# Patient Record
Sex: FEMALE | Race: WHITE | Employment: OTHER | URBAN - METROPOLITAN AREA
[De-identification: names, ages, dates, MRNs, and addresses within clinical notes are randomized per-mention and may not be internally consistent; named-entity substitution may affect disease eponyms.]

---

## 2017-07-18 RX ORDER — LOSARTAN POTASSIUM 100 MG/1
100 TABLET ORAL DAILY
COMMUNITY

## 2017-07-18 RX ORDER — ROSUVASTATIN CALCIUM 10 MG/1
10 TABLET, COATED ORAL
COMMUNITY

## 2017-07-18 NOTE — PERIOP NOTES
[address]  Endoscopy Preprocedure Instructions      1. On the day of your surgery, please report to registration located on the 2nd floor of the  [facility]. yes    2. You must have a responsible adult to drive you to the hospital, stay at the hospital during your procedure and drive you home. If they leave your procedure will not be started (no exceptions). yes    3. Do not have anything to eat or drink (including water, gum, mints, coffee, and juice) after midnight. This does not apply to the medications you were instructed to take by your physician. yesIf you are currently taking Plavix, Coumadin, Aspirin, or other blood-thinning agents, contact your physician for special instructions. not applicable,    4. If you are having a procedure that requires bowel prep: We recommend that you have only clear liquids the day before your procedure and begin your bowel prep by 5:00 pm.  You may continue to drink clear liquids until midnight. If for any reason you are not able to complete your prep please notify your physician immediately. yes    5. Have a list of all current medications, including vitamins, herbal supplements and any other over the counter medications. yes    6. If you wear glasses, contacts, dentures and/or hearing aids, they may be removed prior to procedure, please bring a case to store them in. yes    7. You should understand that if you do not follow these instructions your procedure may be cancelled. If your physical condition changes (I.e. fever, cold or flu) please contact your doctor as soon as possible. 8. It is important that you be on time.   If for any reason you are unable to keep your appointment please call )- the day of or your physicians office prior to your scheduled procedure

## 2017-07-19 ENCOUNTER — HOSPITAL ENCOUNTER (OUTPATIENT)
Age: 77
Setting detail: OUTPATIENT SURGERY
Discharge: HOME | End: 2017-07-19
Attending: COLON & RECTAL SURGERY | Admitting: COLON & RECTAL SURGERY
Payer: MEDICARE

## 2017-07-19 ENCOUNTER — ANESTHESIA EVENT (OUTPATIENT)
Dept: ENDOSCOPY | Age: 77
End: 2017-07-19
Payer: MEDICARE

## 2017-07-19 ENCOUNTER — ANESTHESIA (OUTPATIENT)
Dept: ENDOSCOPY | Age: 77
End: 2017-07-19
Payer: MEDICARE

## 2017-07-19 VITALS
WEIGHT: 150 LBS | HEIGHT: 61 IN | TEMPERATURE: 97.6 F | RESPIRATION RATE: 17 BRPM | BODY MASS INDEX: 28.32 KG/M2 | DIASTOLIC BLOOD PRESSURE: 80 MMHG | SYSTOLIC BLOOD PRESSURE: 134 MMHG | OXYGEN SATURATION: 97 % | HEART RATE: 69 BPM

## 2017-07-19 PROCEDURE — 74011000250 HC RX REV CODE- 250

## 2017-07-19 PROCEDURE — 76040000019: Performed by: COLON & RECTAL SURGERY

## 2017-07-19 PROCEDURE — 74011250636 HC RX REV CODE- 250/636

## 2017-07-19 PROCEDURE — 76060000031 HC ANESTHESIA FIRST 0.5 HR: Performed by: COLON & RECTAL SURGERY

## 2017-07-19 RX ORDER — FLUMAZENIL 0.1 MG/ML
0.2 INJECTION INTRAVENOUS
Status: DISCONTINUED | OUTPATIENT
Start: 2017-07-19 | End: 2017-07-19 | Stop reason: HOSPADM

## 2017-07-19 RX ORDER — DEXTROMETHORPHAN/PSEUDOEPHED 2.5-7.5/.8
1.2 DROPS ORAL
Status: DISCONTINUED | OUTPATIENT
Start: 2017-07-19 | End: 2017-07-19 | Stop reason: HOSPADM

## 2017-07-19 RX ORDER — ATROPINE SULFATE 0.1 MG/ML
0.5 INJECTION INTRAVENOUS
Status: DISCONTINUED | OUTPATIENT
Start: 2017-07-19 | End: 2017-07-19 | Stop reason: HOSPADM

## 2017-07-19 RX ORDER — NALOXONE HYDROCHLORIDE 0.4 MG/ML
0.4 INJECTION, SOLUTION INTRAMUSCULAR; INTRAVENOUS; SUBCUTANEOUS
Status: DISCONTINUED | OUTPATIENT
Start: 2017-07-19 | End: 2017-07-19 | Stop reason: HOSPADM

## 2017-07-19 RX ORDER — LIDOCAINE HYDROCHLORIDE 20 MG/ML
INJECTION, SOLUTION EPIDURAL; INFILTRATION; INTRACAUDAL; PERINEURAL AS NEEDED
Status: DISCONTINUED | OUTPATIENT
Start: 2017-07-19 | End: 2017-07-19 | Stop reason: HOSPADM

## 2017-07-19 RX ORDER — PROPOFOL 10 MG/ML
INJECTION, EMULSION INTRAVENOUS AS NEEDED
Status: DISCONTINUED | OUTPATIENT
Start: 2017-07-19 | End: 2017-07-19 | Stop reason: HOSPADM

## 2017-07-19 RX ORDER — SODIUM CHLORIDE 0.9 % (FLUSH) 0.9 %
5-10 SYRINGE (ML) INJECTION EVERY 8 HOURS
Status: DISCONTINUED | OUTPATIENT
Start: 2017-07-19 | End: 2017-07-19 | Stop reason: HOSPADM

## 2017-07-19 RX ORDER — SODIUM CHLORIDE 9 MG/ML
INJECTION, SOLUTION INTRAVENOUS
Status: DISCONTINUED | OUTPATIENT
Start: 2017-07-19 | End: 2017-07-19 | Stop reason: HOSPADM

## 2017-07-19 RX ORDER — PROPOFOL 10 MG/ML
INJECTION, EMULSION INTRAVENOUS
Status: DISCONTINUED | OUTPATIENT
Start: 2017-07-19 | End: 2017-07-19 | Stop reason: HOSPADM

## 2017-07-19 RX ORDER — SODIUM CHLORIDE 0.9 % (FLUSH) 0.9 %
5-10 SYRINGE (ML) INJECTION AS NEEDED
Status: DISCONTINUED | OUTPATIENT
Start: 2017-07-19 | End: 2017-07-19 | Stop reason: HOSPADM

## 2017-07-19 RX ADMIN — PROPOFOL 120 MCG/KG/MIN: 10 INJECTION, EMULSION INTRAVENOUS at 16:17

## 2017-07-19 RX ADMIN — LIDOCAINE HYDROCHLORIDE 40 MG: 20 INJECTION, SOLUTION EPIDURAL; INFILTRATION; INTRACAUDAL; PERINEURAL at 16:17

## 2017-07-19 RX ADMIN — PROPOFOL 70 MG: 10 INJECTION, EMULSION INTRAVENOUS at 16:17

## 2017-07-19 RX ADMIN — SODIUM CHLORIDE: 9 INJECTION, SOLUTION INTRAVENOUS at 16:07

## 2017-07-19 NOTE — ANESTHESIA POSTPROCEDURE EVALUATION
Post-Anesthesia Evaluation and Assessment    Patient: Kel Mcwilliams MRN: [id number]  SSN: [id number]    YOB: 1940  Age: 68 y.o. Sex: female       Cardiovascular Function/Vital Signs  Visit Vitals    /70    Pulse 76    Temp 36.4 °C (97.6 °F)    Resp 27    Ht 5' 1\" (1.549 m)    Wt 68 kg (150 lb)    SpO2 96%    BMI 28.34 kg/m2       Patient is status post MAC anesthesia for Procedure(s):  COLONOSCOPY. Nausea/Vomiting: None    Postoperative hydration reviewed and adequate. Pain:  Pain Scale 1: Numeric (0 - 10) (07/19/17 1637)  Pain Intensity 1: 0 (07/19/17 1637)   Managed    Neurological Status: At baseline    Mental Status and Level of Consciousness: Arousable    Pulmonary Status:   O2 Device: Room air (07/19/17 1637)   Adequate oxygenation and airway patent    Complications related to anesthesia: None    Post-anesthesia assessment completed.  No concerns    Signed By: Nancy Putnam MD     July 19, 2017

## 2017-07-19 NOTE — PROGRESS NOTES
Received report from CRNA, patient comfortable after procedure, no complaints of pain. See anesthesia record for medications. Endoscope was pre-cleaned at bedside immediately following procedure by Mark.

## 2017-07-19 NOTE — ANESTHESIA PREPROCEDURE EVALUATION
Anesthetic History   No history of anesthetic complications            Review of Systems / Medical History  Patient summary reviewed and pertinent labs reviewed    Pulmonary  Within defined limits                 Neuro/Psych   Within defined limits           Cardiovascular    Hypertension              Exercise tolerance: >4 METS     GI/Hepatic/Renal  Within defined limits              Endo/Other  Within defined limits           Other Findings              Physical Exam    Airway  Mallampati: II  TM Distance: 4 - 6 cm  Neck ROM: normal range of motion   Mouth opening: Normal     Cardiovascular  Regular rate and rhythm,  S1 and S2 normal,  no murmur, click, rub, or gallop  Rhythm: regular  Rate: normal         Dental  No notable dental hx       Pulmonary  Breath sounds clear to auscultation               Abdominal  GI exam deferred       Other Findings            Anesthetic Plan    ASA: 2  Anesthesia type: MAC          Induction: Intravenous  Anesthetic plan and risks discussed with: Patient

## 2017-07-19 NOTE — H&P
Gastroenterology Outpatient History and Physical    Patient: [facility]    Physician: Daksha Haley MD    Vital Signs: Blood pressure 128/55, pulse 79, temperature 98.7 °F (37.1 °C), resp. rate 16, height 5' 1\" (1.549 m), weight 68 kg (150 lb), SpO2 100 %. Allergies: Allergies   Allergen Reactions    Contrast Dye [Iodine] Rash    Morphine Shortness of Breath       Chief Complaint: history of colon cancer    History of Present Illness: as above    Justification for Procedure: as above    History:  Past Medical History:   Diagnosis Date    Colon cancer ([facility] [address] 2007    Essential hypertension     Family history of skin cancer     Heart abnormalities       Past Surgical History:   Procedure Laterality Date    HX CHOLECYSTECTOMY      HX GYN        Social History     Social History    Marital status:      Spouse name: N/A    Number of children: N/A    Years of education: N/A     Social History Main Topics    Smoking status: Never Smoker    Smokeless tobacco: None    Alcohol use No    Drug use: None    Sexual activity: Not Asked     Other Topics Concern    None     Social History Narrative    History reviewed. No pertinent family history. Medications:   Prior to Admission medications    Medication Sig Start Date End Date Taking? Authorizing Provider   losartan (COZAAR) 100 mg tablet Take 100 mg by mouth daily. Yes Historical Provider   rosuvastatin (CRESTOR) 10 mg tablet Take 10 mg by mouth nightly. Yes Historical Provider   aspirin 81 mg tablet Take 81 mg by mouth. Yes Historical Provider   Desvenlafaxine SR (PRISTIQ) 50 mg tablet Take 50 mg by mouth. Yes Historical Provider       Physical Exam:   General: alert, no distress   HEENT: Head: Normocephalic, no lesions, without obvious abnormality.    Heart: regular rate and rhythm, S1, S2 normal, no murmur, click, rub or gallop   Lungs: chest clear, no wheezing, rales, normal symmetric air entry   Abdominal: Bowel sounds are normal, liver is not enlarged, spleen is not enlarged   Neurological: Grossly normal   Extremities: extremities normal, atraumatic, no cyanosis or edema     Findings/Diagnosis: as above    Plan of Care/Planned Procedure: colonoscopy    Signed By: Juanjo Marte MD     July 19, 2017

## 2017-07-19 NOTE — ROUTINE PROCESS
Lucy Curtis  1940  [id number]    Situation:  Verbal report received from: Allie Ramirez RN  Procedure: Procedure(s):  COLONOSCOPY    Background:    Preoperative diagnosis: HX COLON CA  Postoperative diagnosis: NORMAL residual colon    :  Dr. Erlin Purvis  Assistant(s): Endoscopy Technician-1: Mecca Sosa  Endoscopy RN-1: Shellee Harada, RN    Specimens: * No specimens in log *  H. Pylori  no    Assessment:  Intra-procedure medications   Anesthesia gave intra-procedure sedation and medications, see anesthesia flow sheet yes    Intravenous fluids: NS@ KVO     Vital signs stable     Abdominal assessment: round and soft     Recommendation:  Discharge patient per MD order. Family or Friend   Permission to share finding with family or friend yes    Endoscopy discharge instructions have been reviewed and given to patient and daughter. The patient and daughter verbalized understanding and acceptance of instructions.

## 2017-07-19 NOTE — PROCEDURES
[facility]   Endo Brief Procedure Note    Sunil Lock  1940  [id number]    Date of Procedure: 7/19/2017    Preoperative diagnosis: HX COLON CA    Postoperative diagnosis: NORMAL residual colon    Procedure: Procedure(s):  COLONOSCOPY    :  Dr. Adella Siemens, MD    Assistant(s): Endoscopy Technician-1: Jose Sosa  Endoscopy RN-1: Ivet Gama RN    EBL:None    Anesthesia/Sedation: Moderate sedation / MAC    Specimens: * No specimens in log *    Findings: diverticulosis    Complications: None    Dr. Adella Siemens, MD  7/19/2017  4:28 PM

## 2017-07-19 NOTE — DISCHARGE INSTRUCTIONS
Luis Hernandez  [id number]  1940    COLON DISCHARGE INSTRUCTIONS  Discomfort:  Redness at IV site- apply warm compress to area; if redness or soreness persist- contact your physician  There may be a slight amount of blood passed from the rectum  Gaseous discomfort- walking, belching will help relieve any discomfort  You may not operate a vehicle for 12 hours  You may not engage in an occupation involving machinery or appliances for rest of today  You may not drink alcoholic beverages for at least 12 hours  Avoid making any critical decisions for at least 24 hour  DIET:   High fiber diet. - however -  remember your colon is empty and a heavy meal will produce gas. Avoid these foods:  vegetables, fried / greasy foods, carbonated drinks for today    MEDICATIONS:           ACTIVITY:  You may resume your normal daily activities it is recommended that you spend the remainder of the day resting -  avoid any strenuous activity. CALL M.D. ANY SIGN OF:   Increasing pain, nausea, vomiting  Abdominal distension (swelling)  New increased bleeding (oral or rectal)  Fever (chills)  Pain in chest area  Bloody discharge from nose or mouth  Shortness of breath     Follow-up Instructions:   Call Maximiliano Lugo MD if any questions or problems. Telephone # [phone]  Should have a repeat colonoscopy in 3 years. COLONOSCOPY FINDINGS:  Your colonoscopy showed: diverticulosis.

## 2017-07-19 NOTE — IP AVS SNAPSHOT
[address] 
[phone] Patient: Yamileth Al MRN: [id number] You are allergic to the following Allergen Reactions Contrast Dye (Iodine) Rash Morphine Shortness of Breath Recent Documentation Height Weight BMI OB Status Smoking Status 1.549 m 68 kg 28.34 kg/m2 Postmenopausal Never Smoker Emergency Contacts Name Discharge Info Relation Home Work Mobile Case,Kim  Child [2]   [phone] About your hospitalization You were admitted on:  2017 You last received care in the:  [facility] ENDOSCOPY You were discharged on:  2017 Unit phone number:  [phone] Why you were hospitalized Your primary diagnosis was:  Not on File Providers Seen During Your Hospitalizations Provider Role Specialty Primary office phone Lovely Vance MD Attending Provider Colon and Rectal Surgery [phone] Your Primary Care Physician (PCP) Primary Care Physician Office Phone Office Fax [facility] [phone] Follow-up Information Follow up With Details Comments Contact Info Michelle Juan MD   [address] [place] [address] 
[phone] Current Discharge Medication List  
  
CONTINUE these medications which have NOT CHANGED Dose & Instructions Dispensing Information Comments Morning Noon Evening Bedtime  
 aspirin 81 mg tablet Your last dose was: Your next dose is:    
   
   
 Dose:  81 mg Take 81 mg by mouth. Refills:  0  
     
   
   
   
  
 CRESTOR 10 mg tablet Generic drug:  rosuvastatin Your last dose was: Your next dose is:    
   
   
 Dose:  10 mg Take 10 mg by mouth nightly. Refills:  0  
     
   
   
   
  
 losartan 100 mg tablet Commonly known as:  COZAAR Your last dose was: Your next dose is:    
   
   
 Dose:  100 mg Take 100 mg by mouth daily. Refills:  0 PRISTIQ 50 mg ER tablet Generic drug:  desvenlafaxine succinate Your last dose was: Your next dose is:    
   
   
 Dose:  50 mg Take 50 mg by mouth. Refills:  0 Discharge Instructions Chelsey Garcia 
[id number] 
1940 COLON DISCHARGE INSTRUCTIONS Discomfort: 
Redness at IV site- apply warm compress to area; if redness or soreness persist- contact your physician There may be a slight amount of blood passed from the rectum Gaseous discomfort- walking, belching will help relieve any discomfort You may not operate a vehicle for 12 hours You may not engage in an occupation involving machinery or appliances for rest of today You may not drink alcoholic beverages for at least 12 hours Avoid making any critical decisions for at least 24 hour DIET: 
 High fiber diet.  however -  remember your colon is empty and a heavy meal will produce gas. Avoid these foods:  vegetables, fried / greasy foods, carbonated drinks for today MEDICATIONS: 
 
 
 
  
ACTIVITY: 
You may resume your normal daily activities it is recommended that you spend the remainder of the day resting -  avoid any strenuous activity. CALL M.D. ANY SIGN OF: Increasing pain, nausea, vomiting Abdominal distension (swelling) New increased bleeding (oral or rectal) Fever (chills) Pain in chest area Bloody discharge from nose or mouth Shortness of breath Follow-up Instructions: 
 Call Agata Ferrari MD if any questions or problems. Telephone # [phone] Should have a repeat colonoscopy in 3 years. COLONOSCOPY FINDINGS: 
Your colonoscopy showed: diverticulosis. Discharge Orders None Introducing [facility]!    
 [facility] introduces [employer] patient portal. Now you can access parts of your medical record, email your doctor's office, and request medication refills online. 1. In your internet browser, go to [email or web address] 2. Click on the First Time User? Click Here link in the Sign In box. You will see the New Member Sign Up page. 3. Enter your [employer] Access Code exactly as it appears below. You will not need to use this code after youve completed the sign-up process. If you do not sign up before the expiration date, you must request a new code. · [employer] Access Code: [email or web address] Expires: 10/17/2017  1:09 PM 
 
4. Enter the last four digits of your Social Security Number (xxxx) and Date of Birth (mm/dd/yyyy) as indicated and click Submit. You will be taken to the next sign-up page. 5. Create a Neteven ID. This will be your [employer] login ID and cannot be changed, so think of one that is secure and easy to remember. 6. Create a [employer] password. You can change your password at any time. 7. Enter your Password Reset Question and Answer. This can be used at a later time if you forget your password. 8. Enter your e-mail address. You will receive e-mail notification when new information is available in [address] 9. Click Sign Up. You can now view and download portions of your medical record. 10. Click the Download Summary menu link to download a portable copy of your medical information. If you have questions, please visit the Frequently Asked Questions section of the [employer] website. Remember, [employer] is NOT to be used for urgent needs. For medical emergencies, dial 911. Now available from your iPhone and Android! General Information Please provide this summary of care documentation to your next provider. Patient Signature:  ____________________________________________________________ Date:  ____________________________________________________________  
  
[facility]  Provider Signature: ____________________________________________________________ Date:  ____________________________________________________________

## 2017-07-22 NOTE — OP NOTES
Carlos Sullivan [facility] [place] [place] [facility]   [address] [facility], [address]   OP NOTE       Name:  Courtney Houser   MR#:  [id number]   :  1940   Account #:  [de-identified]    Surgery Date:  2017   Date of Adm:  2017       PREOPERATIVE DIAGNOSIS:      POSTOPERATIVE DIAGNOSIS:      PROCEDURES PERFORMED:  Colonoscopy. SURGEON: Mino Luu MD    ESTIMATED BLOOD LOSS: 0    SPECIMENS REMOVED: 0    ANESTHESIA:  MAC    INDICATIONS: The patient is 68years old. She had sigmoid colon   cancer resection 14 years ago. She comes for surveillance. DESCRIPTION OF PROCEDURE: After mechanical bowel prep, after   intravenous sedation with a total of 160 mg of propofol the Olympus   scope was introduced into the rectum and advanced under direct   visualization to the cecum. The ileocecal valve and cecal cap were well   visualized. Good bowel prep. The scope was slowly withdrawn and the   mucosa inspected. She had minimal diverticulosis of the   descending colon. No polyps or neoplasia. The colorectal anastomosis   was visualized and was normal. The rectum was normal. Air was   aspirated, and the patient tolerated the procedure well. IMPRESSION: Minimal diverticulosis, otherwise normal colon following   colon resection for cancer. Recommend colonoscopy in 3 years.         Maldonado Powers MD      CCS / THS   D:  2017   16:33   T:  2017   10:51   Job #:  [id number]

## 2019-06-17 ENCOUNTER — HOSPITAL ENCOUNTER (EMERGENCY)
Age: 79
Discharge: HOME | End: 2019-06-17
Attending: EMERGENCY MEDICINE
Payer: MEDICARE

## 2019-06-17 ENCOUNTER — APPOINTMENT (OUTPATIENT)
Dept: CT IMAGING | Age: 79
End: 2019-06-17
Attending: EMERGENCY MEDICINE
Payer: MEDICARE

## 2019-06-17 VITALS
DIASTOLIC BLOOD PRESSURE: 76 MMHG | TEMPERATURE: 98.3 F | OXYGEN SATURATION: 97 % | RESPIRATION RATE: 17 BRPM | WEIGHT: 150.79 LBS | BODY MASS INDEX: 27.75 KG/M2 | HEIGHT: 62 IN | SYSTOLIC BLOOD PRESSURE: 130 MMHG | HEART RATE: 69 BPM

## 2019-06-17 DIAGNOSIS — G51.0 BELL'S PALSY: Primary | ICD-10-CM

## 2019-06-17 LAB
ANION GAP SERPL CALC-SCNC: 8 MMOL/L (ref 5–15)
BASOPHILS # BLD: 0 K/UL (ref 0–0.1)
BASOPHILS NFR BLD: 0 % (ref 0–1)
BUN SERPL-MCNC: 18 MG/DL (ref 6–20)
BUN/CREAT SERPL: 21 (ref 12–20)
CALCIUM SERPL-MCNC: 9.2 MG/DL (ref 8.5–10.1)
CHLORIDE SERPL-SCNC: 105 MMOL/L (ref 97–108)
CO2 SERPL-SCNC: 27 MMOL/L (ref 21–32)
COMMENT, HOLDF: NORMAL
CREAT SERPL-MCNC: 0.84 MG/DL (ref 0.55–1.02)
DIFFERENTIAL METHOD BLD: ABNORMAL
EOSINOPHIL # BLD: 0.1 K/UL (ref 0–0.4)
EOSINOPHIL NFR BLD: 1 % (ref 0–7)
ERYTHROCYTE [DISTWIDTH] IN BLOOD BY AUTOMATED COUNT: 12.4 % (ref 11.5–14.5)
GLUCOSE BLD STRIP.AUTO-MCNC: 128 MG/DL (ref 65–100)
GLUCOSE SERPL-MCNC: 137 MG/DL (ref 65–100)
HCT VFR BLD AUTO: 39.5 % (ref 35–47)
HGB BLD-MCNC: 13.5 G/DL (ref 11.5–16)
INR PPP: 1 (ref 0.9–1.1)
LYMPHOCYTES # BLD: 1.9 K/UL (ref 0.8–3.5)
LYMPHOCYTES NFR BLD: 30 % (ref 12–49)
MCH RBC QN AUTO: 31.3 PG (ref 26–34)
MCHC RBC AUTO-ENTMCNC: 34.2 G/DL (ref 30–36.5)
MCV RBC AUTO: 91.6 FL (ref 80–99)
MONOCYTES # BLD: 0.6 K/UL (ref 0–1)
MONOCYTES NFR BLD: 10 % (ref 5–13)
NEUTS SEG # BLD: 3.7 K/UL (ref 1.8–8)
NEUTS SEG NFR BLD: 59 % (ref 32–75)
PLATELET # BLD AUTO: 146 K/UL (ref 150–400)
PMV BLD AUTO: 9.2 FL (ref 8.9–12.9)
POTASSIUM SERPL-SCNC: 3.8 MMOL/L (ref 3.5–5.1)
PROTHROMBIN TIME: 9.8 SEC (ref 9–11.1)
RBC # BLD AUTO: 4.31 M/UL (ref 3.8–5.2)
SAMPLES BEING HELD,HOLD: NORMAL
SERVICE CMNT-IMP: ABNORMAL
SODIUM SERPL-SCNC: 140 MMOL/L (ref 136–145)
WBC # BLD AUTO: 6.4 K/UL (ref 3.6–11)

## 2019-06-17 PROCEDURE — 85025 COMPLETE CBC W/AUTO DIFF WBC: CPT

## 2019-06-17 PROCEDURE — 82962 GLUCOSE BLOOD TEST: CPT

## 2019-06-17 PROCEDURE — 99285 EMERGENCY DEPT VISIT HI MDM: CPT

## 2019-06-17 PROCEDURE — 93005 ELECTROCARDIOGRAM TRACING: CPT

## 2019-06-17 PROCEDURE — 70450 CT HEAD/BRAIN W/O DYE: CPT

## 2019-06-17 PROCEDURE — 74011636637 HC RX REV CODE- 636/637: Performed by: EMERGENCY MEDICINE

## 2019-06-17 PROCEDURE — 74011250637 HC RX REV CODE- 250/637: Performed by: EMERGENCY MEDICINE

## 2019-06-17 PROCEDURE — 36415 COLL VENOUS BLD VENIPUNCTURE: CPT

## 2019-06-17 PROCEDURE — 85610 PROTHROMBIN TIME: CPT

## 2019-06-17 PROCEDURE — 80048 BASIC METABOLIC PNL TOTAL CA: CPT

## 2019-06-17 PROCEDURE — A9270 NON-COVERED ITEM OR SERVICE: HCPCS | Performed by: EMERGENCY MEDICINE

## 2019-06-17 RX ORDER — PREDNISONE 20 MG/1
60 TABLET ORAL
Status: COMPLETED | OUTPATIENT
Start: 2019-06-17 | End: 2019-06-17

## 2019-06-17 RX ORDER — ACYCLOVIR 200 MG/1
800 CAPSULE ORAL
Status: COMPLETED | OUTPATIENT
Start: 2019-06-17 | End: 2019-06-17

## 2019-06-17 RX ORDER — PREDNISONE 50 MG/1
50 TABLET ORAL DAILY
Qty: 7 TAB | Refills: 0 | Status: SHIPPED | OUTPATIENT
Start: 2019-06-17 | End: 2019-06-24

## 2019-06-17 RX ORDER — ACYCLOVIR 800 MG/1
800 TABLET ORAL
Qty: 35 TAB | Refills: 0 | Status: SHIPPED | OUTPATIENT
Start: 2019-06-17 | End: 2019-06-24

## 2019-06-17 RX ADMIN — ACYCLOVIR 800 MG: 200 CAPSULE ORAL at 21:33

## 2019-06-17 RX ADMIN — PREDNISONE 60 MG: 20 TABLET ORAL at 21:33

## 2019-06-18 LAB
ATRIAL RATE: 61 BPM
CALCULATED P AXIS, ECG09: 27 DEGREES
CALCULATED R AXIS, ECG10: -3 DEGREES
CALCULATED T AXIS, ECG11: 23 DEGREES
DIAGNOSIS, 93000: NORMAL
P-R INTERVAL, ECG05: 174 MS
Q-T INTERVAL, ECG07: 418 MS
QRS DURATION, ECG06: 92 MS
QTC CALCULATION (BEZET), ECG08: 420 MS
VENTRICULAR RATE, ECG03: 61 BPM

## 2019-06-18 NOTE — ED PROVIDER NOTES
Pt arrives reporting numbness on right side of her face. Significant other reports last known well about 3.5 hours ago. The history is provided by the patient. No  was used. Numbness   This is a new problem. The current episode started 3 to 5 hours ago. The problem has been gradually worsening. There was right facial focality noted. Primary symptoms include loss of sensation. Pertinent negatives include no focal weakness, no loss of balance, no slurred speech, no speech difficulty, no movement disorder, no agitation, no visual change, no auditory change, no mental status change, no unresponsiveness and no disorientation. There has been no fever. Pertinent negatives include no shortness of breath, no chest pain, no vomiting, no altered mental status, no confusion, no headaches, no choking, no nausea, no bowel incontinence and no bladder incontinence. There were no medications administered prior to arrival.        Past Medical History:   Diagnosis Date    Colon cancer [facility]) 2007    Essential hypertension     Family history of skin cancer     Heart abnormalities        Past Surgical History:   Procedure Laterality Date    COLONOSCOPY N/A 7/19/2017    COLONOSCOPY performed by Leslie Caldwell MD at [address]  GYN           History reviewed. No pertinent family history.     Social History     Socioeconomic History    Marital status:      Spouse name: Not on file    Number of children: Not on file    Years of education: Not on file    Highest education level: Not on file   Occupational History    Not on file   Social Needs    Financial resource strain: Not on file    Food insecurity:     Worry: Not on file     Inability: Not on file    Transportation needs:     Medical: Not on file     Non-medical: Not on file   Tobacco Use    Smoking status: Never Smoker    Smokeless tobacco: Never Used   Substance and Sexual Activity    Alcohol use: No    Drug use: Never    Sexual activity: Not on file   Lifestyle    Physical activity:     Days per week: Not on file     Minutes per session: Not on file    Stress: Not on file   Relationships    Social connections:     Talks on phone: Not on file     Gets together: Not on file     Attends Yazidism service: Not on file     Active member of club or organization: Not on file     Attends meetings of clubs or organizations: Not on file     Relationship status: Not on file    Intimate partner violence:     Fear of current or ex partner: Not on file     Emotionally abused: Not on file     Physically abused: Not on file     Forced sexual activity: Not on file   Other Topics Concern    Not on file   Social History Narrative    Not on file     ALLERGIES: Contrast dye [iodine] and Morphine    Review of Systems   Constitutional: Negative for appetite change, chills, fever and unexpected weight change. HENT: Negative for ear pain, hearing loss, rhinorrhea and trouble swallowing. Eyes: Negative for pain and visual disturbance. Respiratory: Negative for cough, choking, chest tightness and shortness of breath. Cardiovascular: Negative for chest pain and palpitations. Gastrointestinal: Negative for abdominal distention, abdominal pain, blood in stool, bowel incontinence, nausea and vomiting. Genitourinary: Negative for bladder incontinence, dysuria, hematuria and urgency. Musculoskeletal: Negative for back pain and myalgias. Skin: Negative for rash. Neurological: Positive for numbness. Negative for dizziness, focal weakness, syncope, speech difficulty, weakness, headaches and loss of balance. Psychiatric/Behavioral: Negative for agitation, confusion and suicidal ideas. All other systems reviewed and are negative.       Vitals:    06/17/19 2100 06/17/19 2115 06/17/19 2130 06/17/19 2145   BP: 136/64 132/80 148/87 130/76   Pulse: 68 64 65 69   Resp: 17 21 17 17   Temp:       SpO2: 97% 98% 98% 97%   Weight: Height:                Physical Exam   Constitutional: She is oriented to person, place, and time. She appears well-developed and well-nourished. No distress. HENT:   Head: Normocephalic and atraumatic. Right Ear: External ear normal.   Left Ear: External ear normal.   Nose: Nose normal.   Mouth/Throat: Oropharynx is clear and moist. No oropharyngeal exudate. Mild right facial droop involving the entire face including the forehead. Unable to fully close right eye. Eyes: Pupils are equal, round, and reactive to light. Conjunctivae and EOM are normal. Right eye exhibits no discharge. Left eye exhibits no discharge. No scleral icterus. Neck: Normal range of motion. Neck supple. No JVD present. No tracheal deviation present. Cardiovascular: Normal rate, regular rhythm, normal heart sounds and intact distal pulses. Exam reveals no gallop and no friction rub. No murmur heard. Pulmonary/Chest: Effort normal and breath sounds normal. No stridor. No respiratory distress. She has no decreased breath sounds. She has no wheezes. She has no rhonchi. She has no rales. She exhibits no tenderness. Abdominal: Soft. Bowel sounds are normal. She exhibits no distension. There is no tenderness. There is no rebound and no guarding. Musculoskeletal: Normal range of motion. She exhibits no edema or tenderness. Neurological: She is alert and oriented to person, place, and time. She has normal reflexes. She displays normal reflexes. A sensory deficit (fight face) is present. No cranial nerve deficit. She exhibits normal muscle tone. Coordination normal. GCS eye subscore is 4. GCS verbal subscore is 5. GCS motor subscore is 6. Skin: Skin is warm and dry. Capillary refill takes less than 2 seconds. No rash noted. She is not diaphoretic. No erythema. No pallor. Psychiatric: She has a normal mood and affect. Her behavior is normal. Judgment and thought content normal.   Nursing note and vitals reviewed. MDM  Number of Diagnoses or Management Options  Bell's palsy:      Amount and/or Complexity of Data Reviewed  Clinical lab tests: ordered and reviewed  Tests in the radiology section of CPT®: ordered and reviewed  Tests in the medicine section of CPT®: ordered and reviewed  Discuss the patient with other providers: yes (Tele-neuro and Pt's PCP)  Independent visualization of images, tracings, or specimens: yes (ekg)    Risk of Complications, Morbidity, and/or Mortality  Presenting problems: moderate  Diagnostic procedures: moderate  Management options: moderate    Patient Progress  Patient progress: stable       Procedures    Chief Complaint   Patient presents with    Numbness       The patient's presenting problems have been discussed, and they are in agreement with the care plan formulated and outlined with them. I have encouraged them to ask questions as they arise throughout their visit. MEDICATIONS GIVEN:  Medications   acyclovir (ZOVIRAX) capsule 800 mg (800 mg Oral Given 6/17/19 2133)   predniSONE (DELTASONE) tablet 60 mg (60 mg Oral Given 6/17/19 2133)       LABS REVIEWED:  Recent Results (from the past 24 hour(s))   GLUCOSE, POC    Collection Time: 06/17/19  8:14 PM   Result Value Ref Range    Glucose (POC) 128 (H) 65 - 100 mg/dL    Performed by [employer]    CBC WITH AUTOMATED DIFF    Collection Time: 06/17/19  8:21 PM   Result Value Ref Range    WBC 6.4 3.6 - 11.0 K/uL    RBC 4.31 3.80 - 5.20 M/uL    HGB 13.5 11.5 - 16.0 g/dL    HCT 39.5 35.0 - 47.0 %    MCV 91.6 80.0 - 99.0 FL    MCH 31.3 26.0 - 34.0 PG    MCHC 34.2 30.0 - 36.5 g/dL    RDW 12.4 11.5 - 14.5 %    PLATELET 038 (L) 735 - 400 K/uL    MPV 9.2 8.9 - 12.9 FL    NEUTROPHILS 59 32 - 75 %    LYMPHOCYTES 30 12 - 49 %    MONOCYTES 10 5 - 13 %    EOSINOPHILS 1 0 - 7 %    BASOPHILS 0 0 - 1 %    ABS. NEUTROPHILS 3.7 1.8 - 8.0 K/UL    ABS. LYMPHOCYTES 1.9 0.8 - 3.5 K/UL    ABS. MONOCYTES 0.6 0.0 - 1.0 K/UL    ABS.  EOSINOPHILS 0.1 0.0 - 0.4 K/UL    ABS. BASOPHILS 0.0 0.0 - 0.1 K/UL    DF AUTOMATED     METABOLIC PANEL, BASIC    Collection Time: 06/17/19  8:21 PM   Result Value Ref Range    Sodium 140 136 - 145 mmol/L    Potassium 3.8 3.5 - 5.1 mmol/L    Chloride 105 97 - 108 mmol/L    CO2 27 21 - 32 mmol/L    Anion gap 8 5 - 15 mmol/L    Glucose 137 (H) 65 - 100 mg/dL    BUN 18 6 - 20 MG/DL    Creatinine 0.84 0.55 - 1.02 MG/DL    BUN/Creatinine ratio 21 (H) 12 - 20      GFR est AA >60 >60 ml/min/1.73m2    GFR est non-AA >60 >60 ml/min/1.73m2    Calcium 9.2 8.5 - 10.1 MG/DL   PROTHROMBIN TIME + INR    Collection Time: 06/17/19  8:21 PM   Result Value Ref Range    INR 1.0 0.9 - 1.1      Prothrombin time 9.8 9.0 - 11.1 sec   SAMPLES BEING HELD    Collection Time: 06/17/19  8:25 PM   Result Value Ref Range    SAMPLES BEING HELD 1 RED 1 SST     COMMENT        Add-on orders for these samples will be processed based on acceptable specimen integrity and analyte stability, which may vary by analyte.    EKG, 12 LEAD, INITIAL    Collection Time: 06/17/19  9:15 PM   Result Value Ref Range    Ventricular Rate 61 BPM    Atrial Rate 61 BPM    P-R Interval 174 ms    QRS Duration 92 ms    Q-T Interval 418 ms    QTC Calculation (Bezet) 420 ms    Calculated P Axis 27 degrees    Calculated R Axis -3 degrees    Calculated T Axis 23 degrees    Diagnosis       Normal sinus rhythm  Minimal voltage criteria for LVH, may be normal variant  Nonspecific T wave abnormality  Abnormal ECG  No previous ECGs available         VITAL SIGNS:  Patient Vitals for the past 12 hrs:   Temp Pulse Resp BP SpO2   06/17/19 2145  69 17 130/76 97 %   06/17/19 2130  65 17 148/87 98 %   06/17/19 2115  64 21 132/80 98 %   06/17/19 2100  68 17 136/64 97 %   06/17/19 2045  72 15 139/77 97 %   06/17/19 2030  72 21 151/70 97 %   06/17/19 2015 98.3 °F (36.8 °C) 76 22 163/73 97 %       RADIOLOGY RESULTS:  The following have been ordered and reviewed:  Ct Code Neuro Head Wo Contrast    Result Date: 6/17/2019  INDICATION: right facial droop EXAM: CT HEAD without contrast. CT dose reduction was achieved through use of a standardized protocol tailored for this examination and automatic exposure control for dose modulation. FINDINGS: Unenhanced CT Head is performed. The brain parenchyma is unremarkable in appearance for age, without evidence for infarct. There is no bleed, mass, shift, hydrocephalus or extra-axial fluid collection. Bone windows are unremarkable. IMPRESSION: No Intracranial Disease Evident on Head CT. ED EKG interpretation:  Rhythm: normal sinus rhythm; and regular . Rate (approx.): 61; Axis: normal; P wave: normal; QRS interval: normal ; ST/T wave: non-specific changes; Other findings: left ventricular hypertrophy. This EKG was interpreted by Darren Malagon DO, ED Provider. CONSULTATIONS:   CONSULT NOTE:  9:05 PM Darren Malagon DO spoke with Dr. Yamilet Kumar, Consult for Neurology. Discussed available diagnostic tests and clinical findings. She is in agreement with care plans as outlined. Dr. Yamilet Kumar recommends MRI of brain with and without contrast with focus on Cranial Nerve 7 for further evaluation and treatment. Agrees that this is Hedrick Palsy. CONSULT NOTE:  9:20 PM Darren Malagon DO spoke with Dr. Bella Reyes, Consult for family practice, on call for Dr. Jesse Thakur (Pt's PCP). Discussed available diagnostic tests and clinical findings. He is in agreement with care plans as outlined. Dr. Bella Reyes will notify Dr. Jesse Thakur to arrange MRI of brain for further evaluation and treatment. PROGRESS NOTES:  Discussed results and plan with patient. Patient will be discharged home with PCP follow up. Patient instructed to return to the emergency room for any worsening symptoms or any other concerns.     DIAGNOSIS:    1. Bell's palsy        PLAN:  Follow-up Information     Follow up With Specialties Details Why Contact Info    Yevgeniy Kelly MD Family Practice Schedule an appointment as soon as possible for a visit  [address] [facility] EMERGENCY DEPT Emergency Medicine  If symptoms worsen [address] [facility] [address] [facility]  [phone]        Discharge Medication List as of 6/17/2019  9:59 PM      START taking these medications    Details   predniSONE (DELTASONE) 50 mg tablet Take 1 Tab by mouth daily for 7 days. , Print, Disp-7 Tab, R-0      acyclovir (ZOVIRAX) 800 mg tablet Take 1 Tab by mouth five (5) times daily for 7 days. , Print, Disp-35 Tab, R-0         CONTINUE these medications which have NOT CHANGED    Details   losartan (COZAAR) 100 mg tablet Take 100 mg by mouth daily. , Historical Med      rosuvastatin (CRESTOR) 10 mg tablet Take 10 mg by mouth nightly., Historical Med      Desvenlafaxine SR (PRISTIQ) 50 mg tablet Take 50 mg by mouth.  , Historical Med         STOP taking these medications       aspirin 81 mg tablet Comments:   Reason for Stopping:               ED COURSE: The patient's hospital course has been uncomplicated.

## 2019-06-18 NOTE — ED NOTES
The patient was discharged home by Dr. Chepe Rivera  in stable condition. The patient is alert and oriented, in no respiratory distress and discharge vital signs obtained. The patient's diagnosis, condition and treatment were explained. The patient expressed understanding. Two prescriptions given. No work/school note given. A discharge plan has been developed. A  was not involved in the process. Aftercare instructions were given. Pt ambulatory out of the ED with family.

## 2019-06-18 NOTE — ED TRIAGE NOTES
Pt arrives reporting numbness on right side of her face.  reports last known well about 3.5 hours ago. Dr. Sara Chowdhury at bedside to evaluate. Blood sugar 128. Code S called.

## 2019-06-18 NOTE — DISCHARGE INSTRUCTIONS
We hope that we have addressed all of your medical concerns. The examination and treatment you received in the Emergency Department were for an emergent problem and were not intended as complete care. It is important that you follow up with your healthcare provider(s) for ongoing care. If your symptoms worsen or do not improve as expected, and you are unable to reach your usual health care provider(s), you should return to the Emergency Department. Today's healthcare is undergoing tremendous change, and patient satisfaction surveys are one of the many tools to assess the quality of medical care. You may receive a survey from the [employer] regarding your experience in the Emergency Department. I hope that your experience has been completely positive, particularly the medical care that I provided. As such, please participate in the survey; anything less than excellent does not meet my expectations or intentions. [facility] and [facility] participate in nationally recognized quality of care measures. If your blood pressure is greater than 120/80, as reported below, we urge that you seek medical care to address the potential of high blood pressure, commonly known as hypertension. Hypertension can be hereditary or can be caused by certain medical conditions, pain, stress, or \"white coat syndrome. \"       Please make an appointment with your health care provider(s) for follow up of your Emergency Department visit. VITALS:   Patient Vitals for the past 8 hrs:   Temp Pulse Resp BP SpO2   06/17/19 2130 -- 65 17 148/87 98 %   06/17/19 2115 -- 64 21 132/80 98 %   06/17/19 2100 -- 68 17 136/64 97 %   06/17/19 2045 -- 72 15 139/77 97 %   06/17/19 2030 -- 72 21 151/70 97 %   06/17/19 2015 98.3 °F (36.8 °C) 76 22 163/73 97 %          Thank you for allowing us to provide you with medical care today.   We realize that you have many choices for your emergency care needs. Please choose us in the future for any continued health care needs. Vishal Richard Jaida Palmer, [address].   Office: [phone]            Recent Results (from the past 24 hour(s))   GLUCOSE, POC    Collection Time: 06/17/19  8:14 PM   Result Value Ref Range    Glucose (POC) 128 (H) 65 - 100 mg/dL    Performed by [employer]    CBC WITH AUTOMATED DIFF    Collection Time: 06/17/19  8:21 PM   Result Value Ref Range    WBC 6.4 3.6 - 11.0 K/uL    RBC 4.31 3.80 - 5.20 M/uL    HGB 13.5 11.5 - 16.0 g/dL    HCT 39.5 35.0 - 47.0 %    MCV 91.6 80.0 - 99.0 FL    MCH 31.3 26.0 - 34.0 PG    MCHC 34.2 30.0 - 36.5 g/dL    RDW 12.4 11.5 - 14.5 %    PLATELET 033 (L) 660 - 400 K/uL    MPV 9.2 8.9 - 12.9 FL    NEUTROPHILS 59 32 - 75 %    LYMPHOCYTES 30 12 - 49 %    MONOCYTES 10 5 - 13 %    EOSINOPHILS 1 0 - 7 %    BASOPHILS 0 0 - 1 %    ABS. NEUTROPHILS 3.7 1.8 - 8.0 K/UL    ABS. LYMPHOCYTES 1.9 0.8 - 3.5 K/UL    ABS. MONOCYTES 0.6 0.0 - 1.0 K/UL    ABS. EOSINOPHILS 0.1 0.0 - 0.4 K/UL    ABS.  BASOPHILS 0.0 0.0 - 0.1 K/UL    DF AUTOMATED     METABOLIC PANEL, BASIC    Collection Time: 06/17/19  8:21 PM   Result Value Ref Range    Sodium 140 136 - 145 mmol/L    Potassium 3.8 3.5 - 5.1 mmol/L    Chloride 105 97 - 108 mmol/L    CO2 27 21 - 32 mmol/L    Anion gap 8 5 - 15 mmol/L    Glucose 137 (H) 65 - 100 mg/dL    BUN 18 6 - 20 MG/DL    Creatinine 0.84 0.55 - 1.02 MG/DL    BUN/Creatinine ratio 21 (H) 12 - 20      GFR est AA >60 >60 ml/min/1.73m2    GFR est non-AA >60 >60 ml/min/1.73m2    Calcium 9.2 8.5 - 10.1 MG/DL   PROTHROMBIN TIME + INR    Collection Time: 06/17/19  8:21 PM   Result Value Ref Range    INR 1.0 0.9 - 1.1      Prothrombin time 9.8 9.0 - 11.1 sec   SAMPLES BEING HELD    Collection Time: 06/17/19  8:25 PM   Result Value Ref Range    SAMPLES BEING HELD 1 RED 1 SST     COMMENT        Add-on orders for these samples will be processed based on acceptable specimen integrity and analyte stability, which may vary by analyte. EKG, 12 LEAD, INITIAL    Collection Time: 06/17/19  9:15 PM   Result Value Ref Range    Ventricular Rate 61 BPM    Atrial Rate 61 BPM    P-R Interval 174 ms    QRS Duration 92 ms    Q-T Interval 418 ms    QTC Calculation (Bezet) 420 ms    Calculated P Axis 27 degrees    Calculated R Axis -3 degrees    Calculated T Axis 23 degrees    Diagnosis       Normal sinus rhythm  Minimal voltage criteria for LVH, may be normal variant  Nonspecific T wave abnormality  Abnormal ECG  No previous ECGs available         Ct Code Neuro Head Wo Contrast    Result Date: 6/17/2019  INDICATION: right facial droop EXAM: CT HEAD without contrast. CT dose reduction was achieved through use of a standardized protocol tailored for this examination and automatic exposure control for dose modulation. FINDINGS: Unenhanced CT Head is performed. The brain parenchyma is unremarkable in appearance for age, without evidence for infarct. There is no bleed, mass, shift, hydrocephalus or extra-axial fluid collection. Bone windows are unremarkable. IMPRESSION: No Intracranial Disease Evident on Head CT. Patient Education        Bell's Palsy: Care Instructions  Your Care Instructions    Bell's palsy is paralysis or weakness of the muscles on one side of the face. Often people with Bell's palsy have a droop on one side of the mouth and have trouble completely shutting the eye on the same side. Bell's palsy can also interfere with the sense of taste. These things happen when a nerve in your face becomes inflamed. Bell's palsy is not caused by a stroke. The cause of the nerve inflammation is not known. But some experts think that a virus may cause it. Because of this, doctors sometimes prescribe antiviral medicine to treat it. You also may get medicine to reduce swelling. Bell's palsy usually gets better on its own in a few weeks or months.   Follow-up care is a key part of your treatment and safety. Be sure to make and go to all appointments, and call your doctor if you are having problems. It's also a good idea to know your test results and keep a list of the medicines you take. How can you care for yourself at home? · Take your medicines exactly as prescribed. Call your doctor if you think you are having a problem with your medicine. You will get more details on the specific medicines your doctor prescribes. · Use artificial tears or ointment if your eyes are too dry. Bell's palsy can make your lower eyelid droop, causing a dry eye. · If you cannot completely close your eye, consider using an eye patch while you sleep. · Help yourself blink by using your finger to close and open your eyelid. This may help keep your eye moist.  · Wear glasses or goggles to keep dust and dirt out of your eye. · As feeling comes back to your face, massage your forehead, cheeks, and lips. Massage may make the muscles in your face stronger. · Brush and floss your teeth often to help prevent tooth decay. Bell's palsy can dry up the spit on one side of your mouth. This increases the risk of tooth decay. When should you call for help? Call 911 anytime you think you may need emergency care. For example, call if:    · You have symptoms of a stroke. These may include:  ? Sudden numbness, tingling, weakness, or loss of movement in your face, arm, or leg, especially on only one side of your body. ? Sudden vision changes. ? Sudden trouble speaking. ? Sudden confusion or trouble understanding simple statements. ? Sudden problems with walking or balance.   ? A sudden, severe headache that is different from past headaches.    Call your doctor now or seek immediate medical care if:    · You have numbness or weakness that spreads beyond one side of your face.     · You have a skin rash or eye pain or redness, or light bothers your eyes.     · You have a new or worse headache.    Watch closely for changes in your health, and be sure to contact your doctor if:    · You do not get better as expected. Where can you learn more? Go to http://suha-jeanette.info/. Enter P168 in the search box to learn more about \"Bell's Palsy: Care Instructions. \"  Current as of: Michaela 3, 2018  Content Version: 11.9  © 1383-2907 [employer]. Care instructions adapted under license by [employer] (which disclaims liability or warranty for this information). If you have questions about a medical condition or this instruction, always ask your healthcare professional. [place] [address] any warranty or liability for your use of this information.

## 2019-06-18 NOTE — ED NOTES
Pt back in treatment room, being evaluated by Dr. Sami Bell, teleneurology.  RN remained at bedside to assist.

## 2020-01-04 ENCOUNTER — HOSPITAL ENCOUNTER (EMERGENCY)
Age: 80
Discharge: HOME | End: 2020-01-04
Attending: EMERGENCY MEDICINE | Admitting: EMERGENCY MEDICINE
Payer: MEDICARE

## 2020-01-04 ENCOUNTER — APPOINTMENT (OUTPATIENT)
Dept: CT IMAGING | Age: 80
End: 2020-01-04
Attending: EMERGENCY MEDICINE
Payer: MEDICARE

## 2020-01-04 VITALS
HEART RATE: 76 BPM | DIASTOLIC BLOOD PRESSURE: 62 MMHG | SYSTOLIC BLOOD PRESSURE: 146 MMHG | WEIGHT: 150.35 LBS | RESPIRATION RATE: 16 BRPM | TEMPERATURE: 98.5 F | OXYGEN SATURATION: 95 % | BODY MASS INDEX: 27.5 KG/M2

## 2020-01-04 DIAGNOSIS — R51.9 NONINTRACTABLE HEADACHE, UNSPECIFIED CHRONICITY PATTERN, UNSPECIFIED HEADACHE TYPE: Primary | ICD-10-CM

## 2020-01-04 DIAGNOSIS — R20.0 FACIAL NUMBNESS: ICD-10-CM

## 2020-01-04 LAB
ANION GAP SERPL CALC-SCNC: 10 MMOL/L (ref 5–15)
APPEARANCE UR: ABNORMAL
BACTERIA URNS QL MICRO: NEGATIVE /HPF
BASOPHILS # BLD: 0 K/UL (ref 0–0.1)
BASOPHILS NFR BLD: 0 % (ref 0–1)
BILIRUB UR QL: NEGATIVE
BUN SERPL-MCNC: 11 MG/DL (ref 6–20)
BUN/CREAT SERPL: 13 (ref 12–20)
CALCIUM SERPL-MCNC: 9.6 MG/DL (ref 8.5–10.1)
CHLORIDE SERPL-SCNC: 103 MMOL/L (ref 97–108)
CO2 SERPL-SCNC: 27 MMOL/L (ref 21–32)
COLOR UR: ABNORMAL
CREAT SERPL-MCNC: 0.85 MG/DL (ref 0.55–1.02)
DIFFERENTIAL METHOD BLD: NORMAL
EOSINOPHIL # BLD: 0.1 K/UL (ref 0–0.4)
EOSINOPHIL NFR BLD: 2 % (ref 0–7)
EPITH CASTS URNS QL MICRO: ABNORMAL /LPF
ERYTHROCYTE [DISTWIDTH] IN BLOOD BY AUTOMATED COUNT: 12.6 % (ref 11.5–14.5)
GLUCOSE SERPL-MCNC: 94 MG/DL (ref 65–100)
GLUCOSE UR STRIP.AUTO-MCNC: NEGATIVE MG/DL
HCT VFR BLD AUTO: 42.9 % (ref 35–47)
HGB BLD-MCNC: 14.4 G/DL (ref 11.5–16)
HGB UR QL STRIP: NEGATIVE
KETONES UR QL STRIP.AUTO: NEGATIVE MG/DL
LEUKOCYTE ESTERASE UR QL STRIP.AUTO: NEGATIVE
LYMPHOCYTES # BLD: 1.8 K/UL (ref 0.8–3.5)
LYMPHOCYTES NFR BLD: 34 % (ref 12–49)
MAGNESIUM SERPL-MCNC: 2.3 MG/DL (ref 1.6–2.4)
MCH RBC QN AUTO: 30.6 PG (ref 26–34)
MCHC RBC AUTO-ENTMCNC: 33.6 G/DL (ref 30–36.5)
MCV RBC AUTO: 91.3 FL (ref 80–99)
MONOCYTES # BLD: 0.4 K/UL (ref 0–1)
MONOCYTES NFR BLD: 9 % (ref 5–13)
NEUTS SEG # BLD: 2.8 K/UL (ref 1.8–8)
NEUTS SEG NFR BLD: 55 % (ref 32–75)
NITRITE UR QL STRIP.AUTO: NEGATIVE
PH UR STRIP: 7.5 [PH] (ref 5–8)
PLATELET # BLD AUTO: 158 K/UL (ref 150–400)
PMV BLD AUTO: 9.4 FL (ref 8.9–12.9)
POTASSIUM SERPL-SCNC: 4.3 MMOL/L (ref 3.5–5.1)
PROT UR STRIP-MCNC: NEGATIVE MG/DL
RBC # BLD AUTO: 4.7 M/UL (ref 3.8–5.2)
RBC #/AREA URNS HPF: ABNORMAL /HPF (ref 0–5)
SODIUM SERPL-SCNC: 140 MMOL/L (ref 136–145)
SP GR UR REFRACTOMETRY: 1.01 (ref 1–1.03)
UR CULT HOLD, URHOLD: NORMAL
UROBILINOGEN UR QL STRIP.AUTO: 0.2 EU/DL (ref 0.2–1)
WBC # BLD AUTO: 5.2 K/UL (ref 3.6–11)
WBC URNS QL MICRO: ABNORMAL /HPF (ref 0–4)

## 2020-01-04 PROCEDURE — 36415 COLL VENOUS BLD VENIPUNCTURE: CPT

## 2020-01-04 PROCEDURE — 85025 COMPLETE CBC W/AUTO DIFF WBC: CPT

## 2020-01-04 PROCEDURE — 99285 EMERGENCY DEPT VISIT HI MDM: CPT

## 2020-01-04 PROCEDURE — 74011000250 HC RX REV CODE- 250: Performed by: EMERGENCY MEDICINE

## 2020-01-04 PROCEDURE — 81001 URINALYSIS AUTO W/SCOPE: CPT

## 2020-01-04 PROCEDURE — 70450 CT HEAD/BRAIN W/O DYE: CPT

## 2020-01-04 PROCEDURE — 74011250637 HC RX REV CODE- 250/637: Performed by: EMERGENCY MEDICINE

## 2020-01-04 PROCEDURE — 93005 ELECTROCARDIOGRAM TRACING: CPT

## 2020-01-04 PROCEDURE — 80048 BASIC METABOLIC PNL TOTAL CA: CPT

## 2020-01-04 PROCEDURE — 83735 ASSAY OF MAGNESIUM: CPT

## 2020-01-04 RX ORDER — LIDOCAINE 4 G/100G
1 PATCH TOPICAL
Status: DISCONTINUED | OUTPATIENT
Start: 2020-01-04 | End: 2020-01-04 | Stop reason: HOSPADM

## 2020-01-04 RX ORDER — BUTALBITAL, ACETAMINOPHEN AND CAFFEINE 50; 325; 40 MG/1; MG/1; MG/1
1 TABLET ORAL
Status: COMPLETED | OUTPATIENT
Start: 2020-01-04 | End: 2020-01-04

## 2020-01-04 RX ORDER — BUTALBITAL, ACETAMINOPHEN AND CAFFEINE 300; 40; 50 MG/1; MG/1; MG/1
1 CAPSULE ORAL
Qty: 20 CAP | Refills: 0 | Status: SHIPPED | OUTPATIENT
Start: 2020-01-04

## 2020-01-04 RX ORDER — SODIUM CHLORIDE 0.9 % (FLUSH) 0.9 %
5-40 SYRINGE (ML) INJECTION EVERY 8 HOURS
Status: DISCONTINUED | OUTPATIENT
Start: 2020-01-04 | End: 2020-01-04 | Stop reason: HOSPADM

## 2020-01-04 RX ORDER — SODIUM CHLORIDE 0.9 % (FLUSH) 0.9 %
5-40 SYRINGE (ML) INJECTION AS NEEDED
Status: DISCONTINUED | OUTPATIENT
Start: 2020-01-04 | End: 2020-01-04 | Stop reason: HOSPADM

## 2020-01-04 RX ADMIN — BUTALBITAL, ACETAMINOPHEN, AND CAFFEINE 1 TABLET: 50; 325; 40 TABLET ORAL at 19:24

## 2020-01-04 NOTE — ED NOTES

## 2020-01-04 NOTE — ED TRIAGE NOTES
Pt ambulated to the treatment area with a steady gait. Pt states \"\"I have had symptoms for a few weeks I have sinus pain and congestion a headache and pain in the right face and neck with cramping and  Numbness. 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 I took 2 baby Asprin this after noon. \"

## 2020-01-05 LAB
ATRIAL RATE: 72 BPM
CALCULATED P AXIS, ECG09: 30 DEGREES
CALCULATED T AXIS, ECG11: 16 DEGREES
DIAGNOSIS, 93000: NORMAL
P-R INTERVAL, ECG05: 174 MS
Q-T INTERVAL, ECG07: 440 MS
QRS DURATION, ECG06: 92 MS
QTC CALCULATION (BEZET), ECG08: 481 MS
VENTRICULAR RATE, ECG03: 72 BPM

## 2020-01-05 NOTE — DISCHARGE INSTRUCTIONS
We hope that we have addressed all of your medical concerns. The examination and treatment you received in the Emergency Department were for an emergent problem and were not intended as complete care. It is important that you follow up with your healthcare provider(s) for ongoing care. If your symptoms worsen or do not improve as expected, and you are unable to reach your usual health care provider(s), you should return to the Emergency Department. Today's healthcare is undergoing tremendous change, and patient satisfaction surveys are one of the many tools to assess the quality of medical care. You may receive a survey from the [facility] regarding your experience in the Emergency Department. I hope that your experience has been completely positive, particularly the medical care that I provided. As such, please participate in the survey; anything less than excellent does not meet my expectations or intentions. [facility] and [address] [place] [address] participate in nationally recognized quality of care measures. If your blood pressure is greater than 120/80, as reported below, we urge that you seek medical care to address the potential of high blood pressure, commonly known as hypertension. Hypertension can be hereditary or can be caused by certain medical conditions, pain, stress, or \"white coat syndrome. \"       Please make an appointment with your health care provider(s) for follow up of your Emergency Department visit. VITALS:   Patient Vitals for the past 8 hrs:   Temp Pulse Resp BP SpO2   01/04/20 1921 -- -- -- 152/72 --   01/04/20 1902 -- 76 16 143/70 97 %   01/04/20 1826 98.5 °F (36.9 °C) 84 16 (!) 204/94 97 %          Thank you for allowing us to provide you with medical care today. We realize that you have many choices for your emergency care needs. Please choose us in the future for any continued health care needs.       Regards, Brooks Lamas, [address] [place] [address] [address]   Office: [phone]            Recent Results (from the past 24 hour(s))   CBC WITH AUTOMATED DIFF    Collection Time: 01/04/20  7:05 PM   Result Value Ref Range    WBC 5.2 3.6 - 11.0 K/uL    RBC 4.70 3.80 - 5.20 M/uL    HGB 14.4 11.5 - 16.0 g/dL    HCT 42.9 35.0 - 47.0 %    MCV 91.3 80.0 - 99.0 FL    MCH 30.6 26.0 - 34.0 PG    MCHC 33.6 30.0 - 36.5 g/dL    RDW 12.6 11.5 - 14.5 %    PLATELET 472 796 - 907 K/uL    MPV 9.4 8.9 - 12.9 FL    NEUTROPHILS 55 32 - 75 %    LYMPHOCYTES 34 12 - 49 %    MONOCYTES 9 5 - 13 %    EOSINOPHILS 2 0 - 7 %    BASOPHILS 0 0 - 1 %    ABS. NEUTROPHILS 2.8 1.8 - 8.0 K/UL    ABS. LYMPHOCYTES 1.8 0.8 - 3.5 K/UL    ABS. MONOCYTES 0.4 0.0 - 1.0 K/UL    ABS. EOSINOPHILS 0.1 0.0 - 0.4 K/UL    ABS.  BASOPHILS 0.0 0.0 - 0.1 K/UL    DF AUTOMATED     METABOLIC PANEL, BASIC    Collection Time: 01/04/20  7:05 PM   Result Value Ref Range    Sodium 140 136 - 145 mmol/L    Potassium 4.3 3.5 - 5.1 mmol/L    Chloride 103 97 - 108 mmol/L    CO2 27 21 - 32 mmol/L    Anion gap 10 5 - 15 mmol/L    Glucose 94 65 - 100 mg/dL    BUN 11 6 - 20 MG/DL    Creatinine 0.85 0.55 - 1.02 MG/DL    BUN/Creatinine ratio 13 12 - 20      GFR est AA >60 >60 ml/min/1.73m2    GFR est non-AA >60 >60 ml/min/1.73m2    Calcium 9.6 8.5 - 10.1 MG/DL   MAGNESIUM    Collection Time: 01/04/20  7:05 PM   Result Value Ref Range    Magnesium 2.3 1.6 - 2.4 mg/dL   EKG, 12 LEAD, INITIAL    Collection Time: 01/04/20  7:29 PM   Result Value Ref Range    Ventricular Rate 72 BPM    Atrial Rate 72 BPM    P-R Interval 174 ms    QRS Duration 92 ms    Q-T Interval 440 ms    QTC Calculation (Bezet) 481 ms    Calculated P Axis 30 degrees    Calculated T Axis 16 degrees    Diagnosis       Normal sinus rhythm  Minimal voltage criteria for LVH, may be normal variant  Borderline ECG  When compared with ECG of 17-JUN-2019 21:15,  Nonspecific T wave abnormality no longer evident in Anterior leads  QT has lengthened     URINALYSIS W/MICROSCOPIC    Collection Time: 01/04/20  7:41 PM   Result Value Ref Range    Color YELLOW/STRAW      Appearance HAZY (A) CLEAR      Specific gravity 1.010 1.003 - 1.030      pH (UA) 7.5 5.0 - 8.0      Protein NEGATIVE  NEG mg/dL    Glucose NEGATIVE  NEG mg/dL    Ketone NEGATIVE  NEG mg/dL    Bilirubin NEGATIVE  NEG      Blood NEGATIVE  NEG      Urobilinogen 0.2 0.2 - 1.0 EU/dL    Nitrites NEGATIVE  NEG      Leukocyte Esterase NEGATIVE  NEG      WBC 0-4 0 - 4 /hpf    RBC 0-5 0 - 5 /hpf    Epithelial cells FEW FEW /lpf    Bacteria NEGATIVE  NEG /hpf   URINE CULTURE HOLD SAMPLE    Collection Time: 01/04/20  7:41 PM   Result Value Ref Range    Urine culture hold        URINE ON HOLD IN MICROBIOLOGY DEPT FOR 3 DAYS. IF UNPRESERVED URINE IS SUBMITTED, IT CANNOT BE USED FOR ADDITIONAL TESTING AFTER 24 HRS, RECOLLECTION WILL BE REQUIRED. Ct Head Wo Cont    Result Date: 1/4/2020  EXAMINATION:  CT HEAD WO CONT CLINICAL INFORMATION:  right facial numbness x 1 month COMPARISON:  6/17/2019 TECHNIQUE: Routine axial head CT was performed. IV contrast was not administered. Sagittal and coronal reconstructions were generated. CT dose reduction was achieved through use of a standardized protocol tailored for this examination and automatic exposure control for dose modulation. FINDINGS: No acute infarct, hemorrhage or mass. VENTRICULAR SYSTEM:  Normal for age. BASAL CISTERNS:  Patent. BRAIN PARENCHYMA:  No major territorial infarct. A focus of white matter hypodensity in the right parietal white matter, unchanged since the previous study, consistent with chronic small vessel disease. MIDLINE SHIFT:  None. CALVARIUM/ SKULL BASE: Intact. PARANASAL SINUSES AND MASTOID AIR CELLS: Clear. VISUALIZED ORBITS: No significant abnormalities. SELLA: No enlargement. IMPRESSION:  No acute findings and no change since 6/17/2019.          Patient Education        Headache: Care Instructions  Your Care Instructions    Headaches have many possible causes. Most headaches aren't a sign of a more serious problem, and they will get better on their own. Home treatment may help you feel better faster. The doctor has checked you carefully, but problems can develop later. If you notice any problems or new symptoms, get medical treatment right away. Follow-up care is a key part of your treatment and safety. Be sure to make and go to all appointments, and call your doctor if you are having problems. It's also a good idea to know your test results and keep a list of the medicines you take. How can you care for yourself at home? · Do not drive if you have taken a prescription pain medicine. · Rest in a quiet, dark room until your headache is gone. Close your eyes and try to relax or go to sleep. Don't watch TV or read. · Put a cold, moist cloth or cold pack on the painful area for 10 to 20 minutes at a time. Put a thin cloth between the cold pack and your skin. · Use a warm, moist towel or a heating pad set on low to relax tight shoulder and neck muscles. · Have someone gently massage your neck and shoulders. · Take pain medicines exactly as directed. ? If the doctor gave you a prescription medicine for pain, take it as prescribed. ? If you are not taking a prescription pain medicine, ask your doctor if you can take an over-the-counter medicine. · Be careful not to take pain medicine more often than the instructions allow, because you may get worse or more frequent headaches when the medicine wears off. · Do not ignore new symptoms that occur with a headache, such as a fever, weakness or numbness, vision changes, or confusion. These may be signs of a more serious problem. To prevent headaches  · Keep a headache diary so you can figure out what triggers your headaches. Avoiding triggers may help you prevent headaches.  Record when each headache began, how long it lasted, and what the pain was like (throbbing, aching, stabbing, or dull). Write down any other symptoms you had with the headache, such as nausea, flashing lights or dark spots, or sensitivity to bright light or loud noise. Note if the headache occurred near your period. List anything that might have triggered the headache, such as certain foods (chocolate, cheese, wine) or odors, smoke, bright light, stress, or lack of sleep. · Find healthy ways to deal with stress. Headaches are most common during or right after stressful times. Take time to relax before and after you do something that has caused a headache in the past.  · Try to keep your muscles relaxed by keeping good posture. Check your jaw, face, neck, and shoulder muscles for tension, and try relaxing them. When sitting at a desk, change positions often, and stretch for 30 seconds each hour. · Get plenty of sleep and exercise. · Eat regularly and well. Long periods without food can trigger a headache. · Treat yourself to a massage. Some people find that regular massages are very helpful in relieving tension. · Limit caffeine by not drinking too much coffee, tea, or soda. But don't quit caffeine suddenly, because that can also give you headaches. · Reduce eyestrain from computers by blinking frequently and looking away from the computer screen every so often. Make sure you have proper eyewear and that your monitor is set up properly, about an arm's length away. · Seek help if you have depression or anxiety. Your headaches may be linked to these conditions. Treatment can both prevent headaches and help with symptoms of anxiety or depression. When should you call for help? Call 911 anytime you think you may need emergency care. For example, call if:    · You have signs of a stroke. These may include:  ? Sudden numbness, paralysis, or weakness in your face, arm, or leg, especially on only one side of your body. ? Sudden vision changes. ? Sudden trouble speaking.   ? Sudden confusion or trouble understanding simple statements. ? Sudden problems with walking or balance. ? A sudden, severe headache that is different from past headaches.    Call your doctor now or seek immediate medical care if:    · You have a new or worse headache.     · Your headache gets much worse. Where can you learn more? Go to http://suha-jeanette.info/. Enter M271 in the search box to learn more about \"Headache: Care Instructions. \"  Current as of: March 28, 2019  Content Version: 12.2  © 9315-1285 [employer]. Care instructions adapted under license by [employer] (which disclaims liability or warranty for this information). If you have questions about a medical condition or this instruction, always ask your healthcare professional. [address] any warranty or liability for your use of this information. Patient Education        Numbness and Tingling: Care Instructions  Your Care Instructions    Many things can cause numbness or tingling. Swelling may put pressure on a nerve. This could cause you to lose feeling or have a pins-and-needles sensation on part of your body. Nerves may be damaged from trauma, toxins, or diseases, such as diabetes or multiple sclerosis (MS). Sometimes, though, the cause is not clear. If there is no clear reason for your symptoms, and you are not having any other symptoms, your doctor may suggest watching and waiting for a while to see if the numbness or tingling goes away on its own. Your doctor may want you to have blood or nerve tests to find the cause of your symptoms. Follow-up care is a key part of your treatment and safety. Be sure to make and go to all appointments, and call your doctor if you are having problems. It's also a good idea to know your test results and keep a list of the medicines you take. How can you care for yourself at home?   · If your doctor prescribes medicine, take it exactly as directed. Call your doctor if you think you are having a problem with your medicine. · If you have any swelling, put ice or a cold pack on the area for 10 to 20 minutes at a time. Put a thin cloth between the ice and your skin. When should you call for help? Call 911 anytime you think you may need emergency care. For example, call if:    · You have weakness, numbness, or tingling in both legs.     · You lose bowel or bladder control.     · You have symptoms of a stroke. These may include:  ? Sudden numbness, tingling, weakness, or loss of movement in your face, arm, or leg, especially on only one side of your body. ? Sudden vision changes. ? Sudden trouble speaking. ? Sudden confusion or trouble understanding simple statements. ? Sudden problems with walking or balance. ? A sudden, severe headache that is different from past headaches.    Watch closely for changes in your health, and be sure to contact your doctor if you have any problems, or if:    · You do not get better as expected. Where can you learn more? Go to http://suha-jeanette.info/. Enter U234 in the search box to learn more about \"Numbness and Tingling: Care Instructions. \"  Current as of: March 28, 2019  Content Version: 12.2  © 0712-0685 [employer]. Care instructions adapted under license by [employer] (which disclaims liability or warranty for this information). If you have questions about a medical condition or this instruction, always ask your healthcare professional. [place] [address] any warranty or liability for your use of this information.

## 2020-01-05 NOTE — ED NOTES
Bedside shift change report given to [address] (oncoming nurse) by Olivia Ellis. Ahsan Zambrano RN (offgoing nurse). Report included the following information SBAR.

## 2020-01-05 NOTE — ED NOTES
The patient was discharged home by Dr Beba Saenz in stable condition. The patient is alert and oriented, in no respiratory distress and discharge vital signs obtained. The patient's diagnosis, condition and treatment were explained. The patient expressed understanding. One prescription was given. No work/school note given. A discharge plan has been developed. A  was not involved in the process. Aftercare instructions were given. Pt ambulatory out of the ED with family.

## 2020-01-05 NOTE — ED PROVIDER NOTES
This is a 71-year-old female comes emergency room with chief complaint of right-sided face and neck pain. Patient states that she has a history of Bell's palsy about 6 months ago. Patient is seen her PCP as well as neurologist and everything checked out okay. Patient states over the past few weeks she has had sinus pain and congestion. Patient states that her face is felt funny for the past month. Patient denies any fever or chills. Patient states the pain starts at the base of the neck and comes up the right side of her head on the parietal aspect of her scalp. Patient denies any temporal headache. Patient denies any nausea or vomiting. Patient did take 2 aspirin earlier today. Patient denies any chest pain, shortness of breath, abdominal pain, urinary symptoms. The history is provided by the patient. No  was used. Headache    This is a new problem. The current episode started more than 1 week ago. The problem occurs hourly. The problem has been gradually worsening. The pain is located in the right unilateral, parietal and occipital region. The quality of the pain is described as throbbing. The pain is moderate. Pertinent negatives include no anorexia, no fever, no malaise/fatigue, no chest pressure, no near-syncope, no orthopnea, no palpitations, no syncope, no shortness of breath, no weakness, no tingling, no dizziness, no visual change, no nausea and no vomiting. She has tried aspirin for the symptoms. Past Medical History:   Diagnosis Date    Colon cancer [facility]) 2007    Essential hypertension     Family history of skin cancer     Heart abnormalities     Ill-defined condition     shingles right chest    Neurological disorder     Janaes Jumana       Past Surgical History:   Procedure Laterality Date    COLONOSCOPY N/A 7/19/2017    COLONOSCOPY performed by Charley Rich MD at [facility] GYN           History reviewed.  No pertinent family history. Social History     Socioeconomic History    Marital status:      Spouse name: Not on file    Number of children: Not on file    Years of education: Not on file    Highest education level: Not on file   Occupational History    Not on file   Social Needs    Financial resource strain: Not on file    Food insecurity:     Worry: Not on file     Inability: Not on file    Transportation needs:     Medical: Not on file     Non-medical: Not on file   Tobacco Use    Smoking status: Never Smoker    Smokeless tobacco: Never Used   Substance and Sexual Activity    Alcohol use: No    Drug use: Never    Sexual activity: Not on file   Lifestyle    Physical activity:     Days per week: Not on file     Minutes per session: Not on file    Stress: Not on file   Relationships    Social connections:     Talks on phone: Not on file     Gets together: Not on file     Attends [employer] service: Not on file     Active member of club or organization: Not on file     Attends meetings of clubs or organizations: Not on file     Relationship status: Not on file    Intimate partner violence:     Fear of current or ex partner: Not on file     Emotionally abused: Not on file     Physically abused: Not on file     Forced sexual activity: Not on file   Other Topics Concern    Not on file   Social History Narrative    Not on file     ALLERGIES: Contrast dye [iodine] and Morphine    Review of Systems   Constitutional: Negative for appetite change, chills, fever, malaise/fatigue and unexpected weight change. HENT: Negative for ear pain, hearing loss, rhinorrhea and trouble swallowing. Eyes: Negative for pain and visual disturbance. Respiratory: Negative for cough, chest tightness and shortness of breath. Cardiovascular: Negative for chest pain, palpitations, orthopnea, syncope and near-syncope.    Gastrointestinal: Negative for abdominal distention, abdominal pain, anorexia, blood in stool, nausea and vomiting. Genitourinary: Negative for dysuria, hematuria and urgency. Musculoskeletal: Negative for back pain and myalgias. Skin: Negative for rash. Neurological: Positive for numbness and headaches. Negative for dizziness, tingling, syncope and weakness. Psychiatric/Behavioral: Negative for confusion and suicidal ideas. All other systems reviewed and are negative. Vitals:    01/04/20 1826 01/04/20 1902 01/04/20 1921   BP: (!) 204/94 143/70 152/72   Pulse: 84 76    Resp: 16 16    Temp: 98.5 °F (36.9 °C)     SpO2: 97% 97%    Weight: 68.2 kg (150 lb 5.7 oz)              Physical Exam  Vitals signs and nursing note reviewed. Constitutional:       General: She is not in acute distress. Appearance: Normal appearance. She is well-developed. She is not ill-appearing, toxic-appearing or diaphoretic. HENT:      Head: Normocephalic and atraumatic. Right Ear: External ear normal.      Left Ear: External ear normal.      Nose: Nose normal.      Mouth/Throat:      Pharynx: No oropharyngeal exudate. Eyes:      General: No scleral icterus. Right eye: No discharge. Left eye: No discharge. Conjunctiva/sclera: Conjunctivae normal.      Pupils: Pupils are equal, round, and reactive to light. Neck:      Musculoskeletal: Normal range of motion and neck supple. Vascular: No JVD. Trachea: No tracheal deviation. Cardiovascular:      Rate and Rhythm: Normal rate and regular rhythm. Heart sounds: Normal heart sounds. No murmur. No friction rub. No gallop. Pulmonary:      Effort: Pulmonary effort is normal. No respiratory distress. Breath sounds: Normal breath sounds. No stridor. No decreased breath sounds, wheezing, rhonchi or rales. Chest:      Chest wall: No tenderness. Abdominal:      General: Bowel sounds are normal. There is no distension. Palpations: Abdomen is soft. Tenderness: There is no tenderness. There is no guarding or rebound. Musculoskeletal: Normal range of motion. General: No tenderness. Skin:     General: Skin is warm and dry. Capillary Refill: Capillary refill takes less than 2 seconds. Coloration: Skin is not pale. Findings: No erythema or rash. Neurological:      General: No focal deficit present. Mental Status: She is alert and oriented to person, place, and time. GCS: GCS eye subscore is 4. GCS verbal subscore is 5. GCS motor subscore is 6. Cranial Nerves: No cranial nerve deficit. Sensory: Sensory deficit present. Motor: Motor function is intact. No weakness or abnormal muscle tone. Coordination: Coordination normal.      Deep Tendon Reflexes: Reflexes are normal and symmetric. Reflexes normal.      Comments: Slight decrease sensation along the right face upper and lower. Bilateral upper extremities and lower extremities are neurovascular intact there is no sensory or motor deficits noted. Psychiatric:         Mood and Affect: Mood normal.         Behavior: Behavior normal.         Thought Content: Thought content normal.         Judgment: Judgment normal.        MDM       Procedures    Chief Complaint   Patient presents with    Headache    Numbness     right face       The patient's presenting problems have been discussed, and they are in agreement with the care plan formulated and outlined with them. I have encouraged them to ask questions as they arise throughout their visit.     MEDICATIONS GIVEN:  Medications   sodium chloride (NS) flush 5-40 mL (has no administration in time range)   sodium chloride (NS) flush 5-40 mL (has no administration in time range)   lidocaine 4 % patch 1 Patch (1 Patch TransDERmal Apply Patch 1/4/20 1925)   butalbital-acetaminophen-caffeine (FIORICET, ESGIC) -40 mg per tablet 1 Tab (1 Tab Oral Given 1/4/20 1924)       LABS REVIEWED:  Recent Results (from the past 24 hour(s))   CBC WITH AUTOMATED DIFF    Collection Time: 01/04/20 7:05 PM   Result Value Ref Range    WBC 5.2 3.6 - 11.0 K/uL    RBC 4.70 3.80 - 5.20 M/uL    HGB 14.4 11.5 - 16.0 g/dL    HCT 42.9 35.0 - 47.0 %    MCV 91.3 80.0 - 99.0 FL    MCH 30.6 26.0 - 34.0 PG    MCHC 33.6 30.0 - 36.5 g/dL    RDW 12.6 11.5 - 14.5 %    PLATELET 873 076 - 118 K/uL    MPV 9.4 8.9 - 12.9 FL    NEUTROPHILS 55 32 - 75 %    LYMPHOCYTES 34 12 - 49 %    MONOCYTES 9 5 - 13 %    EOSINOPHILS 2 0 - 7 %    BASOPHILS 0 0 - 1 %    ABS. NEUTROPHILS 2.8 1.8 - 8.0 K/UL    ABS. LYMPHOCYTES 1.8 0.8 - 3.5 K/UL    ABS. MONOCYTES 0.4 0.0 - 1.0 K/UL    ABS. EOSINOPHILS 0.1 0.0 - 0.4 K/UL    ABS.  BASOPHILS 0.0 0.0 - 0.1 K/UL    DF AUTOMATED     METABOLIC PANEL, BASIC    Collection Time: 01/04/20  7:05 PM   Result Value Ref Range    Sodium 140 136 - 145 mmol/L    Potassium 4.3 3.5 - 5.1 mmol/L    Chloride 103 97 - 108 mmol/L    CO2 27 21 - 32 mmol/L    Anion gap 10 5 - 15 mmol/L    Glucose 94 65 - 100 mg/dL    BUN 11 6 - 20 MG/DL    Creatinine 0.85 0.55 - 1.02 MG/DL    BUN/Creatinine ratio 13 12 - 20      GFR est AA >60 >60 ml/min/1.73m2    GFR est non-AA >60 >60 ml/min/1.73m2    Calcium 9.6 8.5 - 10.1 MG/DL   MAGNESIUM    Collection Time: 01/04/20  7:05 PM   Result Value Ref Range    Magnesium 2.3 1.6 - 2.4 mg/dL   EKG, 12 LEAD, INITIAL    Collection Time: 01/04/20  7:29 PM   Result Value Ref Range    Ventricular Rate 72 BPM    Atrial Rate 72 BPM    P-R Interval 174 ms    QRS Duration 92 ms    Q-T Interval 440 ms    QTC Calculation (Bezet) 481 ms    Calculated P Axis 30 degrees    Calculated T Axis 16 degrees    Diagnosis       Normal sinus rhythm  Minimal voltage criteria for LVH, may be normal variant  Borderline ECG  When compared with ECG of 17-JUN-2019 21:15,  Nonspecific T wave abnormality no longer evident in Anterior leads  QT has lengthened     URINALYSIS W/MICROSCOPIC    Collection Time: 01/04/20  7:41 PM   Result Value Ref Range    Color YELLOW/STRAW      Appearance HAZY (A) CLEAR      Specific gravity 1.010 1.003 - 1.030      pH (UA) 7.5 5.0 - 8.0      Protein NEGATIVE  NEG mg/dL    Glucose NEGATIVE  NEG mg/dL    Ketone NEGATIVE  NEG mg/dL    Bilirubin NEGATIVE  NEG      Blood NEGATIVE  NEG      Urobilinogen 0.2 0.2 - 1.0 EU/dL    Nitrites NEGATIVE  NEG      Leukocyte Esterase NEGATIVE  NEG      WBC 0-4 0 - 4 /hpf    RBC 0-5 0 - 5 /hpf    Epithelial cells FEW FEW /lpf    Bacteria NEGATIVE  NEG /hpf   URINE CULTURE HOLD SAMPLE    Collection Time: 01/04/20  7:41 PM   Result Value Ref Range    Urine culture hold        URINE ON HOLD IN MICROBIOLOGY DEPT FOR 3 DAYS. IF UNPRESERVED URINE IS SUBMITTED, IT CANNOT BE USED FOR ADDITIONAL TESTING AFTER 24 HRS, RECOLLECTION WILL BE REQUIRED. VITAL SIGNS:  Patient Vitals for the past 24 hrs:   Temp Pulse Resp BP SpO2   01/04/20 1921    152/72    01/04/20 1902  76 16 143/70 97 %   01/04/20 1826 98.5 °F (36.9 °C) 84 16 (!) 204/94 97 %       RADIOLOGY RESULTS:  The following have been ordered and reviewed:  Ct Head Wo Cont    Result Date: 1/4/2020  EXAMINATION:  CT HEAD WO CONT CLINICAL INFORMATION:  right facial numbness x 1 month COMPARISON:  6/17/2019 TECHNIQUE: Routine axial head CT was performed. IV contrast was not administered. Sagittal and coronal reconstructions were generated. CT dose reduction was achieved through use of a standardized protocol tailored for this examination and automatic exposure control for dose modulation. FINDINGS: No acute infarct, hemorrhage or mass. VENTRICULAR SYSTEM:  Normal for age. BASAL CISTERNS:  Patent. BRAIN PARENCHYMA:  No major territorial infarct. A focus of white matter hypodensity in the right parietal white matter, unchanged since the previous study, consistent with chronic small vessel disease. MIDLINE SHIFT:  None. CALVARIUM/ SKULL BASE: Intact. PARANASAL SINUSES AND MASTOID AIR CELLS: Clear. VISUALIZED ORBITS: No significant abnormalities. SELLA: No enlargement.      IMPRESSION:  No acute findings and no change since 6/17/2019. ED EKG interpretation:  Rhythm: normal sinus rhythm; and regular . Rate (approx.): 72; Axis: normal; P wave: normal; QRS interval: normal ; ST/T wave: normal; Other findings: Borderline EKG with minimal voltage for LVH. . This EKG was interpreted by Liliana Lombardo DO, ED Provider. PROGRESS NOTES:  Patient feeling better after Fioricet and Lidoderm patches. Suspect that the right facial numbness is secondary from Bell's palsy. Patient has no other deficits. Discussed results and plan with patient and family. Patient will be discharged home with PCP and neurology follow up. Patient instructed to return to the emergency room for any worsening symptoms or any other concerns. DIAGNOSIS:    1. Nonintractable headache, unspecified chronicity pattern, unspecified headache type    2. Facial numbness        PLAN:  Follow-up Information     Follow up With Specialties Details Why Contact Info    Rubens Stiles MD Family Practice Schedule an appointment as soon as possible for a visit  [address] [place] [address]      your neurologist  Schedule an appointment as soon as possible for a visit      [facility] EMERGENCY DEPT Emergency Medicine  If symptoms worsen [address] [place], [place] [id number]  [phone]        Current Discharge Medication List      START taking these medications    Details   butalbital-acetaminophen-caff (FIORICET) -40 mg per capsule Take 1 Cap by mouth every four (4) hours as needed for Pain. Qty: 20 Cap, Refills: 0         CONTINUE these medications which have NOT CHANGED    Details   losartan (COZAAR) 100 mg tablet Take 100 mg by mouth daily. rosuvastatin (CRESTOR) 10 mg tablet Take 10 mg by mouth nightly. Desvenlafaxine SR (PRISTIQ) 50 mg tablet Take 50 mg by mouth. ED COURSE: The patient's hospital course has been uncomplicated.

## 2021-01-28 ENCOUNTER — TRANSCRIBE ORDER (OUTPATIENT)
Dept: SCHEDULING | Age: 81
End: 2021-01-28

## 2021-01-28 DIAGNOSIS — E04.1 THYROID NODULE: ICD-10-CM

## 2021-01-28 DIAGNOSIS — R42 DIZZINESS: Primary | ICD-10-CM

## 2021-01-29 ENCOUNTER — TRANSCRIBE ORDER (OUTPATIENT)
Dept: SCHEDULING | Age: 81
End: 2021-01-29

## 2021-01-29 DIAGNOSIS — R42 DIZZINESS: Primary | ICD-10-CM

## 2021-01-29 DIAGNOSIS — E04.1 THYROID NODULE: ICD-10-CM

## 2021-02-11 ENCOUNTER — HOSPITAL ENCOUNTER (OUTPATIENT)
Dept: ULTRASOUND IMAGING | Age: 81
Discharge: HOME | End: 2021-02-11
Payer: MEDICARE

## 2021-02-11 ENCOUNTER — HOSPITAL ENCOUNTER (OUTPATIENT)
Dept: CT IMAGING | Age: 81
Discharge: HOME | End: 2021-02-11
Payer: MEDICARE

## 2021-02-11 DIAGNOSIS — R42 DIZZINESS: ICD-10-CM

## 2021-02-11 DIAGNOSIS — E04.1 THYROID NODULE: ICD-10-CM

## 2021-02-11 PROCEDURE — 76536 US EXAM OF HEAD AND NECK: CPT

## 2021-02-23 ENCOUNTER — TRANSCRIBE ORDER (OUTPATIENT)
Dept: SCHEDULING | Age: 81
End: 2021-02-23

## 2021-02-23 DIAGNOSIS — E04.1 THYROID NODULE: ICD-10-CM

## 2021-02-23 DIAGNOSIS — R42 DIZZINESS: Primary | ICD-10-CM

## 2021-05-24 ENCOUNTER — TRANSCRIBE ORDER (OUTPATIENT)
Dept: SCHEDULING | Age: 81
End: 2021-05-24

## 2021-05-24 DIAGNOSIS — D34 BENIGN NEOPLASM OF THYROID: Primary | ICD-10-CM

## 2021-07-11 ENCOUNTER — HOSPITAL ENCOUNTER (INPATIENT)
Age: 81
LOS: 3 days | Discharge: HOME | DRG: 390 | End: 2021-07-14
Attending: STUDENT IN AN ORGANIZED HEALTH CARE EDUCATION/TRAINING PROGRAM | Admitting: SURGERY
Payer: MEDICARE

## 2021-07-11 ENCOUNTER — APPOINTMENT (OUTPATIENT)
Dept: CT IMAGING | Age: 81
DRG: 390 | End: 2021-07-11
Attending: STUDENT IN AN ORGANIZED HEALTH CARE EDUCATION/TRAINING PROGRAM
Payer: MEDICARE

## 2021-07-11 DIAGNOSIS — K56.609 SMALL BOWEL OBSTRUCTION (HCC): Primary | ICD-10-CM

## 2021-07-11 LAB
ALBUMIN SERPL-MCNC: 4.2 G/DL (ref 3.5–5)
ALBUMIN/GLOB SERPL: 1.1 {RATIO} (ref 1.1–2.2)
ALP SERPL-CCNC: 100 U/L (ref 45–117)
ALT SERPL-CCNC: 32 U/L (ref 12–78)
ANION GAP SERPL CALC-SCNC: 9 MMOL/L (ref 5–15)
AST SERPL-CCNC: 19 U/L (ref 15–37)
ATRIAL RATE: 65 BPM
BASOPHILS # BLD: 0 K/UL (ref 0–0.1)
BASOPHILS NFR BLD: 1 % (ref 0–1)
BILIRUB SERPL-MCNC: 0.4 MG/DL (ref 0.2–1)
BUN SERPL-MCNC: 14 MG/DL (ref 6–20)
BUN/CREAT SERPL: 15 (ref 12–20)
CALCIUM SERPL-MCNC: 9.2 MG/DL (ref 8.5–10.1)
CALCULATED P AXIS, ECG09: 32 DEGREES
CALCULATED R AXIS, ECG10: 3 DEGREES
CALCULATED T AXIS, ECG11: 42 DEGREES
CHLORIDE SERPL-SCNC: 106 MMOL/L (ref 97–108)
CO2 SERPL-SCNC: 29 MMOL/L (ref 21–32)
CREAT SERPL-MCNC: 0.94 MG/DL (ref 0.55–1.02)
DIAGNOSIS, 93000: NORMAL
DIFFERENTIAL METHOD BLD: NORMAL
EOSINOPHIL # BLD: 0.1 K/UL (ref 0–0.4)
EOSINOPHIL NFR BLD: 2 % (ref 0–7)
ERYTHROCYTE [DISTWIDTH] IN BLOOD BY AUTOMATED COUNT: 12.5 % (ref 11.5–14.5)
GLOBULIN SER CALC-MCNC: 3.7 G/DL (ref 2–4)
GLUCOSE SERPL-MCNC: 120 MG/DL (ref 65–100)
HCT VFR BLD AUTO: 43.6 % (ref 35–47)
HGB BLD-MCNC: 14.6 G/DL (ref 11.5–16)
IMM GRANULOCYTES # BLD AUTO: 0 K/UL (ref 0–0.04)
IMM GRANULOCYTES NFR BLD AUTO: 0 % (ref 0–0.5)
LIPASE SERPL-CCNC: 842 U/L (ref 73–393)
LYMPHOCYTES # BLD: 1.2 K/UL (ref 0.8–3.5)
LYMPHOCYTES NFR BLD: 17 % (ref 12–49)
MCH RBC QN AUTO: 30 PG (ref 26–34)
MCHC RBC AUTO-ENTMCNC: 33.5 G/DL (ref 30–36.5)
MCV RBC AUTO: 89.7 FL (ref 80–99)
MONOCYTES # BLD: 0.5 K/UL (ref 0–1)
MONOCYTES NFR BLD: 7 % (ref 5–13)
NEUTS SEG # BLD: 5 K/UL (ref 1.8–8)
NEUTS SEG NFR BLD: 73 % (ref 32–75)
NRBC # BLD: 0 K/UL (ref 0–0.01)
NRBC BLD-RTO: 0 PER 100 WBC
P-R INTERVAL, ECG05: 162 MS
PLATELET # BLD AUTO: 169 K/UL (ref 150–400)
PMV BLD AUTO: 9.2 FL (ref 8.9–12.9)
POTASSIUM SERPL-SCNC: 4.1 MMOL/L (ref 3.5–5.1)
PROT SERPL-MCNC: 7.9 G/DL (ref 6.4–8.2)
Q-T INTERVAL, ECG07: 440 MS
QRS DURATION, ECG06: 88 MS
QTC CALCULATION (BEZET), ECG08: 457 MS
RBC # BLD AUTO: 4.86 M/UL (ref 3.8–5.2)
SODIUM SERPL-SCNC: 144 MMOL/L (ref 136–145)
TROPONIN I SERPL-MCNC: <0.05 NG/ML
VENTRICULAR RATE, ECG03: 65 BPM
WBC # BLD AUTO: 6.9 K/UL (ref 3.6–11)

## 2021-07-11 PROCEDURE — 96374 THER/PROPH/DIAG INJ IV PUSH: CPT

## 2021-07-11 PROCEDURE — 96375 TX/PRO/DX INJ NEW DRUG ADDON: CPT

## 2021-07-11 PROCEDURE — 85025 COMPLETE CBC W/AUTO DIFF WBC: CPT

## 2021-07-11 PROCEDURE — 74176 CT ABD & PELVIS W/O CONTRAST: CPT

## 2021-07-11 PROCEDURE — 84484 ASSAY OF TROPONIN QUANT: CPT

## 2021-07-11 PROCEDURE — 83690 ASSAY OF LIPASE: CPT

## 2021-07-11 PROCEDURE — 74011250636 HC RX REV CODE- 250/636: Performed by: SURGERY

## 2021-07-11 PROCEDURE — 93005 ELECTROCARDIOGRAM TRACING: CPT

## 2021-07-11 PROCEDURE — 96361 HYDRATE IV INFUSION ADD-ON: CPT

## 2021-07-11 PROCEDURE — 99284 EMERGENCY DEPT VISIT MOD MDM: CPT

## 2021-07-11 PROCEDURE — 65660000000 HC RM CCU STEPDOWN

## 2021-07-11 PROCEDURE — 80053 COMPREHEN METABOLIC PANEL: CPT

## 2021-07-11 PROCEDURE — 36415 COLL VENOUS BLD VENIPUNCTURE: CPT

## 2021-07-11 PROCEDURE — 74011250636 HC RX REV CODE- 250/636: Performed by: STUDENT IN AN ORGANIZED HEALTH CARE EDUCATION/TRAINING PROGRAM

## 2021-07-11 RX ORDER — ONDANSETRON 2 MG/ML
4 INJECTION INTRAMUSCULAR; INTRAVENOUS
Status: DISCONTINUED | OUTPATIENT
Start: 2021-07-11 | End: 2021-07-14 | Stop reason: HOSPADM

## 2021-07-11 RX ORDER — ASPIRIN 81 MG/1
81 TABLET ORAL DAILY
COMMUNITY

## 2021-07-11 RX ORDER — SODIUM CHLORIDE 9 MG/ML
1000 INJECTION, SOLUTION INTRAVENOUS ONCE
Status: COMPLETED | OUTPATIENT
Start: 2021-07-11 | End: 2021-07-11

## 2021-07-11 RX ORDER — HYDROMORPHONE HYDROCHLORIDE 1 MG/ML
0.5 INJECTION, SOLUTION INTRAMUSCULAR; INTRAVENOUS; SUBCUTANEOUS
Status: DISCONTINUED | OUTPATIENT
Start: 2021-07-11 | End: 2021-07-14 | Stop reason: HOSPADM

## 2021-07-11 RX ORDER — KETOROLAC TROMETHAMINE 30 MG/ML
15 INJECTION, SOLUTION INTRAMUSCULAR; INTRAVENOUS
Status: COMPLETED | OUTPATIENT
Start: 2021-07-11 | End: 2021-07-11

## 2021-07-11 RX ORDER — ONDANSETRON 2 MG/ML
4 INJECTION INTRAMUSCULAR; INTRAVENOUS
Status: COMPLETED | OUTPATIENT
Start: 2021-07-11 | End: 2021-07-11

## 2021-07-11 RX ORDER — SODIUM CHLORIDE, SODIUM LACTATE, POTASSIUM CHLORIDE, CALCIUM CHLORIDE 600; 310; 30; 20 MG/100ML; MG/100ML; MG/100ML; MG/100ML
100 INJECTION, SOLUTION INTRAVENOUS CONTINUOUS
Status: DISCONTINUED | OUTPATIENT
Start: 2021-07-11 | End: 2021-07-13

## 2021-07-11 RX ADMIN — HYDROMORPHONE HYDROCHLORIDE 0.5 MG: 1 INJECTION, SOLUTION INTRAMUSCULAR; INTRAVENOUS; SUBCUTANEOUS at 22:01

## 2021-07-11 RX ADMIN — ONDANSETRON 4 MG: 2 INJECTION INTRAMUSCULAR; INTRAVENOUS at 22:01

## 2021-07-11 RX ADMIN — SODIUM CHLORIDE 1000 ML: 9 INJECTION, SOLUTION INTRAVENOUS at 12:45

## 2021-07-11 RX ADMIN — SODIUM CHLORIDE, POTASSIUM CHLORIDE, SODIUM LACTATE AND CALCIUM CHLORIDE 100 ML/HR: 600; 310; 30; 20 INJECTION, SOLUTION INTRAVENOUS at 14:47

## 2021-07-11 RX ADMIN — ONDANSETRON 4 MG: 2 INJECTION INTRAMUSCULAR; INTRAVENOUS at 12:45

## 2021-07-11 RX ADMIN — KETOROLAC TROMETHAMINE 15 MG: 30 INJECTION, SOLUTION INTRAMUSCULAR; INTRAVENOUS at 12:45

## 2021-07-11 RX ADMIN — HYDROMORPHONE HYDROCHLORIDE 0.5 MG: 1 INJECTION, SOLUTION INTRAMUSCULAR; INTRAVENOUS; SUBCUTANEOUS at 14:46

## 2021-07-11 NOTE — ED NOTES
TRANSFER - OUT REPORT:    Verbal report given to NRP with [facility](name) on Janay Gupta  being transferred to [facility] (unit) for routine progression of care       Report consisted of patients Situation, Background, Assessment and   Recommendations(SBAR). Information from the following report(s) SBAR was reviewed with the NRP with [facility]. Lines:   Peripheral IV 07/11/21 Right Wrist (Active)   Site Assessment Clean, dry, & intact 07/11/21 1235   Phlebitis Assessment 0 07/11/21 1235   Infiltration Assessment 0 07/11/21 1235   Dressing Status Clean, dry, & intact 07/11/21 1235   Dressing Type Transparent 07/11/21 1235   Hub Color/Line Status Pink 07/11/21 1235        Opportunity for questions and clarification was provided. Patient transported with: Fidelia Hallmark monitoring IV /hr Reassessment at this time is improved pain and no nausea at this time pt is stable for transport as ordered.

## 2021-07-11 NOTE — ED TRIAGE NOTES
Pt ambulated to the treatment area with a steady gait guarding her stomach. Pt states \"I started abd pain last night about 3am I have had some vomiting. I thought it was acid reflux. I took Pesolantie 32 it did not help. \"

## 2021-07-11 NOTE — ED NOTES
Purposeful rounding done. Pt sitting up on stretcher. Offered assist with any needs. Pt states \"pain level 6  and no further nausea. \" Pt medicated for pain as ordered /hr infusing as ordered. Call bell in reach will continue to monitor.

## 2021-07-11 NOTE — PROGRESS NOTES
TRANSFER - IN REPORT:    Verbal report received from [facility], RN(name) on Monica Lucas  being received from [facility] ED(unit) for routine progression of care      Report consisted of patients Situation, Background, Assessment and   Recommendations(SBAR). Information from the following report(s) SBAR, Kardex, ED Summary, MAR, Recent Results and Cardiac Rhythm SR/SB was reviewed with the receiving nurse. Opportunity for questions and clarification was provided. Assessment completed upon patients arrival to unit and care assumed. This patient was assisted with Intentional Toileting every 2 hours during this shift as appropriate. Documentation of ambulation and output reflected on Flowsheet as appropriate. Purposeful hourly rounding was completed using AIDET and 5Ps. Outcomes of PHR documented as they occurred. Bed alarm in use as appropriate. Dual Suction and ambubag in place. Bedside and Verbal shift change report given to Olga Lidia Bland (oncoming nurse) by Sadi Canela RN (offgoing nurse).  Report included the following information SBAR, Kardex, ED Summary, MAR, Recent Results and Cardiac Rhythm SB.

## 2021-07-11 NOTE — ROUTINE PROCESS
TRANSFER - OUT REPORT:    Verbal report given to Jade Wyatt RN(name) on Berta Yan  being transferred to [address] [place] [address] 329(unit) for routine progression of care       Report consisted of patients Situation, Background, Assessment and   Recommendations(SBAR). Information from the following report(s) SBAR, MAR, vitals, all test results, pain was reviewed with the receiving nurse. Lines:   Peripheral IV 07/11/21 Right Wrist (Active)   Site Assessment Clean, dry, & intact 07/11/21 1235   Phlebitis Assessment 0 07/11/21 1235   Infiltration Assessment 0 07/11/21 1235   Dressing Status Clean, dry, & intact 07/11/21 1235   Dressing Type Transparent 07/11/21 1235   Hub Color/Line Status Pink 07/11/21 1235        Opportunity for questions and clarification was provided. Patient transported with: [facility] ALS monitoring IV /hr  Reassessment at this time is unchanged pt is stable for transport as ordered.

## 2021-07-11 NOTE — ED PROVIDER NOTES
Patient is an 80-year-old female history of colon resection in 2015 secondary to colon cancer, hypertension, hysterectomy and cholecystectomy presenting today with abdominal pain. She says that yesterday all day she was not really feeling well at all with some nausea and vague abdominal discomfort. Early this morning around two or 3 AM she woke up with pretty severe left-sided upper and lower abdominal discomfort. She has had nausea and vomiting x1. She took over-the-counter antacids without improvement in symptoms. Due to her persistence and severity of symptoms she came into the emergency department today. Denies urinary symptoms. Denies diarrhea. Denies fever. Past Medical History:   Diagnosis Date    Colon cancer [facility]) 2007    Essential hypertension     Family history of skin cancer     Heart abnormalities     Ill-defined condition     shingles right chest    Neurological disorder     William Denney       Past Surgical History:   Procedure Laterality Date    COLONOSCOPY N/A 7/19/2017    COL[facility] by Jesus Gamez MD at [facility] HX CHOLECYSTECTOMY      HX GYN           No family history on file.     Social History     Socioeconomic History    Marital status:      Spouse name: Not on file    Number of children: Not on file    Years of education: Not on file    Highest education level: Not on file   Occupational History    Not on file   Tobacco Use    Smoking status: Never Smoker    Smokeless tobacco: Never Used   Substance and Sexual Activity    Alcohol use: No    Drug use: Never    Sexual activity: Not on file   Other Topics Concern    Not on file   Social History Narrative    Not on file     Social Determinants of Health     Financial Resource Strain:     Difficulty of Paying Living Expenses:    Food Insecurity:     Worried About Running Out of Food in the Last Year:     [address] in the Last Year:    Transportation Needs:     Lack of Transportation (Medical):  Lack of Transportation (Non-Medical):    Physical Activity:     Days of Exercise per Week:     Minutes of Exercise per Session:    Stress:     Feeling of Stress :    Social Connections:     Frequency of Communication with Friends and Family:     Frequency of Social Gatherings with Friends and Family:     Attends [employer] Services:     Active Member of Clubs or Organizations:     Attends Club or Organization Meetings:     Marital Status:    Intimate Partner Violence:     Fear of Current or Ex-Partner:     Emotionally Abused:     Physically Abused:     Sexually Abused: ALLERGIES: Contrast dye [iodine] and Morphine    Review of Systems   Constitutional: Negative for chills and fever. HENT: Negative for congestion and rhinorrhea. Eyes: Negative for redness and visual disturbance. Respiratory: Negative for cough and shortness of breath. Cardiovascular: Negative for chest pain and leg swelling. Gastrointestinal: Positive for abdominal pain, nausea and vomiting. Negative for diarrhea. Genitourinary: Negative for dysuria, flank pain, frequency, hematuria and urgency. Musculoskeletal: Negative for arthralgias, back pain, myalgias and neck pain. Skin: Negative for rash and wound. Allergic/Immunologic: Negative for immunocompromised state. Neurological: Negative for dizziness and headaches. Vitals:    07/11/21 1218   BP: (!) 172/85   Pulse: 68   Resp: 22   Temp: 98.3 °F (36.8 °C)   SpO2: 97%   Weight: 67.7 kg (149 lb 4 oz)   Height: 5' 2\" (1.575 m)            Physical Exam  Vitals and nursing note reviewed. Constitutional:       General: She is not in acute distress. Appearance: She is well-developed. She is not diaphoretic. HENT:      Head: Normocephalic. Mouth/Throat:      Pharynx: No oropharyngeal exudate. Eyes:      General:         Right eye: No discharge. Left eye: No discharge.       Pupils: Pupils are equal, round, and reactive to light. Cardiovascular:      Rate and Rhythm: Normal rate and regular rhythm. Heart sounds: Normal heart sounds. No murmur heard. No friction rub. No gallop. Pulmonary:      Effort: Pulmonary effort is normal. No respiratory distress. Breath sounds: Normal breath sounds. No stridor. No wheezing or rales. Abdominal:      General: Bowel sounds are decreased. There is no distension. Palpations: Abdomen is soft. Tenderness: There is abdominal tenderness in the left upper quadrant and left lower quadrant. There is no rebound. Musculoskeletal:         General: No deformity. Normal range of motion. Cervical back: Normal range of motion and neck supple. Skin:     General: Skin is warm and dry. Capillary Refill: Capillary refill takes less than 2 seconds. Findings: No rash. Neurological:      Mental Status: She is alert and oriented to person, place, and time. Psychiatric:         Behavior: Behavior normal.        ED provider EKG interpretation  Normal sinus rhythm, rate of 65, normal axis, normal intervals, no ST elevations or depressions, nonspecific ST-T wave changes          [de-identified]year-old female presenting today with left upper and left lower quadrant abdominal pain with findings of small bowel obstruction and transition point on CT. Her lab work is reassuring. She has no significant electrolyte abnormalities. Lipase is moderately elevated, although pancreatitis unlikely be the source of her pain today. I did get a troponin and EKG due to cardiac history and complains of upper abdominal discomfort and nausea and these were both fine. She received Toradol and Zofran. IV fluids initiated. Will admit to general surgery for further care. ICD-10-CM ICD-9-CM    1.  Small bowel obstruction (HCC)  K56.609 560.9      DIO Degroot DO

## 2021-07-12 PROBLEM — E78.5 HYPERLIPIDEMIA: Status: ACTIVE | Noted: 2021-07-12

## 2021-07-12 PROBLEM — K21.9 GERD (GASTROESOPHAGEAL REFLUX DISEASE): Status: ACTIVE | Noted: 2021-07-12

## 2021-07-12 PROBLEM — I10 HTN (HYPERTENSION): Status: ACTIVE | Noted: 2021-07-12

## 2021-07-12 LAB
ANION GAP SERPL CALC-SCNC: 2 MMOL/L (ref 5–15)
APPEARANCE UR: CLEAR
BACTERIA URNS QL MICRO: NEGATIVE /HPF
BASOPHILS # BLD: 0 K/UL (ref 0–0.1)
BASOPHILS NFR BLD: 0 % (ref 0–1)
BILIRUB UR QL: NEGATIVE
BUN SERPL-MCNC: 16 MG/DL (ref 6–20)
BUN/CREAT SERPL: 20 (ref 12–20)
CALCIUM SERPL-MCNC: 7.8 MG/DL (ref 8.5–10.1)
CHLORIDE SERPL-SCNC: 113 MMOL/L (ref 97–108)
CO2 SERPL-SCNC: 28 MMOL/L (ref 21–32)
COLOR UR: ABNORMAL
CREAT SERPL-MCNC: 0.81 MG/DL (ref 0.55–1.02)
DIFFERENTIAL METHOD BLD: ABNORMAL
EOSINOPHIL # BLD: 0.1 K/UL (ref 0–0.4)
EOSINOPHIL NFR BLD: 3 % (ref 0–7)
EPITH CASTS URNS QL MICRO: NORMAL /LPF
ERYTHROCYTE [DISTWIDTH] IN BLOOD BY AUTOMATED COUNT: 12.6 % (ref 11.5–14.5)
GLUCOSE SERPL-MCNC: 91 MG/DL (ref 65–100)
GLUCOSE UR STRIP.AUTO-MCNC: NEGATIVE MG/DL
HCT VFR BLD AUTO: 38.8 % (ref 35–47)
HGB BLD-MCNC: 12.5 G/DL (ref 11.5–16)
HGB UR QL STRIP: NEGATIVE
IMM GRANULOCYTES # BLD AUTO: 0 K/UL (ref 0–0.04)
IMM GRANULOCYTES NFR BLD AUTO: 0 % (ref 0–0.5)
KETONES UR QL STRIP.AUTO: NEGATIVE MG/DL
LEUKOCYTE ESTERASE UR QL STRIP.AUTO: ABNORMAL
LYMPHOCYTES # BLD: 1.2 K/UL (ref 0.8–3.5)
LYMPHOCYTES NFR BLD: 26 % (ref 12–49)
MCH RBC QN AUTO: 30.3 PG (ref 26–34)
MCHC RBC AUTO-ENTMCNC: 32.2 G/DL (ref 30–36.5)
MCV RBC AUTO: 93.9 FL (ref 80–99)
MONOCYTES # BLD: 0.4 K/UL (ref 0–1)
MONOCYTES NFR BLD: 9 % (ref 5–13)
NEUTS SEG # BLD: 3.1 K/UL (ref 1.8–8)
NEUTS SEG NFR BLD: 62 % (ref 32–75)
NITRITE UR QL STRIP.AUTO: NEGATIVE
NRBC # BLD: 0 K/UL (ref 0–0.01)
NRBC BLD-RTO: 0 PER 100 WBC
PH UR STRIP: 5 [PH] (ref 5–8)
PLATELET # BLD AUTO: 140 K/UL (ref 150–400)
PMV BLD AUTO: 9.3 FL (ref 8.9–12.9)
POTASSIUM SERPL-SCNC: 4.2 MMOL/L (ref 3.5–5.1)
PROT UR STRIP-MCNC: NEGATIVE MG/DL
RBC # BLD AUTO: 4.13 M/UL (ref 3.8–5.2)
RBC #/AREA URNS HPF: NORMAL /HPF (ref 0–5)
RBC MORPH BLD: ABNORMAL
SODIUM SERPL-SCNC: 143 MMOL/L (ref 136–145)
SP GR UR REFRACTOMETRY: >1.03 (ref 1–1.03)
UR CULT HOLD, URHOLD: NORMAL
UROBILINOGEN UR QL STRIP.AUTO: 0.2 EU/DL (ref 0.2–1)
WBC # BLD AUTO: 4.8 K/UL (ref 3.6–11)
WBC URNS QL MICRO: NORMAL /HPF (ref 0–4)

## 2021-07-12 PROCEDURE — 65660000000 HC RM CCU STEPDOWN

## 2021-07-12 PROCEDURE — 74011250637 HC RX REV CODE- 250/637: Performed by: INTERNAL MEDICINE

## 2021-07-12 PROCEDURE — 85025 COMPLETE CBC W/AUTO DIFF WBC: CPT

## 2021-07-12 PROCEDURE — 36415 COLL VENOUS BLD VENIPUNCTURE: CPT

## 2021-07-12 PROCEDURE — 74011250636 HC RX REV CODE- 250/636: Performed by: SURGERY

## 2021-07-12 PROCEDURE — 80048 BASIC METABOLIC PNL TOTAL CA: CPT

## 2021-07-12 PROCEDURE — 81001 URINALYSIS AUTO W/SCOPE: CPT

## 2021-07-12 RX ORDER — BUTALBITAL, ACETAMINOPHEN AND CAFFEINE 50; 325; 40 MG/1; MG/1; MG/1
1 TABLET ORAL
Status: DISCONTINUED | OUTPATIENT
Start: 2021-07-12 | End: 2021-07-14 | Stop reason: HOSPADM

## 2021-07-12 RX ORDER — DESVENLAFAXINE SUCCINATE 50 MG/1
50 TABLET, EXTENDED RELEASE ORAL DAILY
Status: DISCONTINUED | OUTPATIENT
Start: 2021-07-13 | End: 2021-07-14 | Stop reason: HOSPADM

## 2021-07-12 RX ORDER — ROSUVASTATIN CALCIUM 10 MG/1
10 TABLET, COATED ORAL
Status: DISCONTINUED | OUTPATIENT
Start: 2021-07-12 | End: 2021-07-14 | Stop reason: HOSPADM

## 2021-07-12 RX ORDER — LOSARTAN POTASSIUM 50 MG/1
100 TABLET ORAL DAILY
Status: DISCONTINUED | OUTPATIENT
Start: 2021-07-13 | End: 2021-07-14 | Stop reason: HOSPADM

## 2021-07-12 RX ORDER — HYDRALAZINE HYDROCHLORIDE 20 MG/ML
10 INJECTION INTRAMUSCULAR; INTRAVENOUS
Status: DISCONTINUED | OUTPATIENT
Start: 2021-07-12 | End: 2021-07-14 | Stop reason: HOSPADM

## 2021-07-12 RX ADMIN — SODIUM CHLORIDE, POTASSIUM CHLORIDE, SODIUM LACTATE AND CALCIUM CHLORIDE 100 ML/HR: 600; 310; 30; 20 INJECTION, SOLUTION INTRAVENOUS at 03:00

## 2021-07-12 RX ADMIN — ROSUVASTATIN CALCIUM 10 MG: 10 TABLET, COATED ORAL at 22:33

## 2021-07-12 RX ADMIN — HYDROMORPHONE HYDROCHLORIDE 0.5 MG: 1 INJECTION, SOLUTION INTRAMUSCULAR; INTRAVENOUS; SUBCUTANEOUS at 23:40

## 2021-07-12 RX ADMIN — SODIUM CHLORIDE, POTASSIUM CHLORIDE, SODIUM LACTATE AND CALCIUM CHLORIDE 100 ML/HR: 600; 310; 30; 20 INJECTION, SOLUTION INTRAVENOUS at 11:36

## 2021-07-12 RX ADMIN — SODIUM CHLORIDE, POTASSIUM CHLORIDE, SODIUM LACTATE AND CALCIUM CHLORIDE 100 ML/HR: 600; 310; 30; 20 INJECTION, SOLUTION INTRAVENOUS at 21:23

## 2021-07-12 NOTE — ACP (ADVANCE CARE PLANNING)
Advance Care Planning   Advance Care Planning Inpatient Note  [facility]    Today's Date: 7/12/2021  Unit: [facility] 3 PROG CARE TELE 2    Received request from In basket. Upon review of chart and communication with care team, patient's decision making abilities are not in question. Patient was/were present in the room during visit. Goals of ACP Conversation:  Discuss Advance Care planning documents    Health Care Decision Makers:      Primary Decision Maker: Cachorro Warner - Daughter - [phone]    Primary Decision Maker: Reji Wei - Son - [phone]  Click here to complete [address] including selection of the Healthcare Decision Maker Relationship (ie \"Primary\")     Today we:  Documented Next of Kin, per patient report    Advance Care Planning Documents (Patient Wishes) on file:  None     Assessment:     responded to an in-basket request to assist Mrs. Kenny Francis with an Advance Medical Directive (AMD) on the [place] [address] unit. Mrs. Kenny Francis was awake, alert, and lying in bed when the  came into the room. She made good eye contact and greeted the  warmly. She is very hard of hearing. Mrs. Kenny Francis openly shared about her illness concerns and shared that she is feeling much better today.  inquired about the AMD request and Mrs. Kenny Francis shared she is interested in having more information.  reviewed the AMD form and answered her questions as needed. She shared she would like her son, Donn Peralta, and daughter, Tony Lyn, to be her primary healthcare decision makers. She declined to complete the form at this time.   left the AMD form and the Your Right To Decide brochure on [facility] bedside table per her request.         Interventions:  Provided education on documents for clarity and greater understanding  Discussed and provided education on state decision maker hierarchy  Encouraged ongoing ACP conversation with future decision makers and loved ones  Reviewed but did not complete ACP document     Outcomes/Plan:  Documented Mrs. Lilia Ricardo legal next of kin per her request.     Electronically signed by Marshal Brewster on 7/12/2021 at 11:26 AM

## 2021-07-12 NOTE — PROGRESS NOTES
Bedside and Verbal shift change report given to Anu José (oncoming nurse) by Mark Hurd RN (offgoing nurse). Report included the following information SBAR, Kardex, ED Summary, Procedure Summary, Intake/Output, Recent Results, Med Rec Status and Cardiac Rhythm SB. Bedside and Verbal shift change report given to Fransisco Curran RN (oncoming nurse) by Anu José (offgoing nurse). Report included the following information SBAR, Kardex, ED Summary, Intake/Output, Recent Results, Med Rec Status and Cardiac Rhythm NSR/SB.

## 2021-07-12 NOTE — PROGRESS NOTES
Assessment / Plan:   SBO, feels better. Clear liquids, advance as tolerated. Hospitalist for medical care. Akhil Jaramillo MD  [address] [facility]  Office:  [phone]  Fax:  [phone]        General Surgery Daily Progress Note      Patient: Alexey Camargo MRN: [id number]  SSN: [id number]    YOB: 1940  Age: [de-identified] y.o. Sex: female      Admit Date: 7/11/2021 for SBO    Subjective:   Patient feels better, passing flatus. Pain improved. Current Facility-Administered Medications   Medication Dose Route Frequency    hydrALAZINE (APRESOLINE) 20 mg/mL injection 10 mg  10 mg IntraVENous Q6H PRN    lactated Ringers infusion  100 mL/hr IntraVENous CONTINUOUS    HYDROmorphone (DILAUDID) syringe 0.5 mg  0.5 mg IntraVENous Q3H PRN    ondansetron (ZOFRAN) injection 4 mg  4 mg IntraVENous Q6H PRN        Objective:   No intake/output data recorded. 07/10 1901 - 07/12 0700  In: 2273.3 [P.O.:75; I.V.:2198.3]  Out: -   Patient Vitals for the past 8 hrs:   BP Temp Pulse Resp SpO2   07/12/21 1142 (!) 120/56 99.6 °F (37.6 °C) 63 16 95 %   07/12/21 0803 (!) 107/56 98.9 °F (37.2 °C) 66 16 93 %   07/12/21 0700   67         Physical Exam:  General: Alert, cooperative, no distress, appears stated age. Neck:  Supple, symmetrical, trachea midline, no adenopathy, thyroid: no                           enlargement/tenderness/nodules, no carotid bruit and no JVD. Lungs: Clear to auscultation bilaterally. Heart:  Regular rate and rhythm, S1, S2 normal, no murmur, click, rub or gallop. Abdomen: Soft, tender in low abdomen. Bowel sounds normal. No masses,  No organomegaly. Extremities: Extremities normal, atraumatic, no cyanosis or edema.   Skin:  Skin color, texture, turgor normal. No rashes or lesions    Labs:   Recent Labs     07/12/21  0557   WBC 4.8   HGB 12.5   HCT 38.8   *     Recent Labs     07/12/21  0557 07/11/21  1237 07/11/21  1237      < > 144   K 4.2   < > 4.1   *   < > 106   CO2 28   < > 29   GLU 91   < > 120*   BUN 16   < > 14   CREA 0.81   < > 0.94   CA 7.8*   < > 9.2   ALB  --   --  4.2   TBILI  --   --  0.4   ALT  --   --  32    < > = values in this interval not displayed.      ·     Active Problems:    SBO (small bowel obstruction) ([facility] [address] (7/11/2021)      HTN (hypertension) (7/12/2021)      Hyperlipidemia (7/12/2021)      GERD (gastroesophageal reflux disease) (7/12/2021)        Problem List Items Addressed This Visit     None      Visit Diagnoses     Small bowel obstruction ([facility] [address]    -  Primary

## 2021-07-12 NOTE — H&P
Carlos Sullivan [address]rie [place] [address]  HISTORY AND PHYSICAL    Name:  Valencia Mayo  MR#:  [id number]  :  1940  ACCOUNT #:  [de-identified]  ADMIT DATE:  2021      CHIEF COMPLAINT:  Abdominal pain. HISTORY OF PRESENT ILLNESS:  The patient is an 68-year-old female who was in her usual state of health when this morning she woke up with fairly severe left-sided upper and lower abdominal pain. She had some nausea and had one episode of emesis. She tried some Heaven-Churdan without any improvement. She laid down for a while and when her symptoms persisted, she presented to [facility] Emergency Department. She underwent a workup at that time including a CT scan which revealed findings consistent with small-bowel obstruction. She had her last bowel movement yesterday and it was relatively normal.  She has passed a very small amount of flatus today. She denies any fever or chills. No urinary symptoms. No diarrhea. To her knowledge, she has not had a bowel obstruction before. She does have a history of colon cancer back in . PAST MEDICAL HISTORY:  Hypertension and colon cancer. PAST SURGICAL HISTORY:  Colectomy, hysterectomy, cholecystectomy. MEDICATIONS:  Include Crestor 10 mg daily, Cozaar 100 mg daily, Pristiq 50 mg, and Fioricet p.r.n. ALLERGIES:  CONTRAST DYE AND MORPHINE, WHICH CAUSED RASH AND SHORTNESS OF BREATH. SOCIAL HISTORY:  Denies tobacco or ethanol use. FAMILY HISTORY:  Unobtainable. REVIEW OF SYSTEMS:  A 12-point review of systems was discussed and is negative except as noted in the HP.    PHYSICAL EXAMINATION:  GENERAL:  Well developed, well nourished, in no acute distress. Alert and oriented x3. HEENT:  Atraumatic, normocephalic. Sclerae clear and anicteric. Mucous membranes dry. NECK:  Supple. Full range of motion. CHEST:  Clear and nonlabored breathing. HEART:  Regular rate and rhythm. ABDOMEN:  Soft, minimal distention.   Slight mid abdominal tenderness. No rebound or guarding. No hernias. No masses. EXTREMITIES:  No clubbing, cyanosis, or edema. SKIN:  Warm, dry. Normal turgor. PSYCH:  Normal mood and affect. LABORATORY DATA:  Laboratory studies were reviewed. White blood cell count is normal, hemoglobin 14.6, platelet 311. Chemistry is unremarkable. Troponin is negative. CT scan of the abdomen and pelvis shows dilated proximal loops of small bowel with a transition point in the lower midline abdomen. ASSESSMENT AND PLAN:  The patient has findings consistent with a small-bowel obstruction, which is most likely adhesive in nature. We discussed treatment options for bowel obstruction. Currently, she is quite comfortable and has no continued nausea or vomiting. We will hold off an NG tube decompression, but keep her n.p.o. for bowel rest.  We will gently hydrate her, recheck her labs tomorrow, and follow her clinically. We did discuss the risk of deterioration requiring surgery or the chance that she would not improve with conservative management, in which case she would need surgery at that juncture. All questions were answered.       Kimberly Rhodes MD CB/[email or web address]/[email or web address]  D:  07/11/2021 18:03  T:  07/11/2021 22:10  JOB #:  [id number]

## 2021-07-12 NOTE — PROGRESS NOTES
This patient was assisted with Intentional Toileting every 2 hours during this shift as appropriate. Documentation of ambulation and output reflected on Flowsheet as appropriate.       Purposeful hourly rounding was completed using AIDET and 5Ps. Bed alarm in use as appropriate. Dual Suction and ambubag in place.

## 2021-07-12 NOTE — PROGRESS NOTES
Reason for Admission:  Small bowel obstruction  Pt stated she is independent with ADL's, drives and walks unaided. Medications are from [facility] in [place] on [address]. RUR Score:    8%                 Plan for utilizing home health:     none     PCP: First and Last name:  Stefania Tom MD     Name of Practice:  [facility]   Are you a current patient: Yes/No:  yes   Approximate date of last visit:  2-3 months ago   Can you participate in a virtual visit with your PCP:  no                    Current Advanced Directive/Advance Care Plan: No Order  Her son and daughter are her next of kin    Healthcare Decision Maker:   Click here to [address] including selection of the Healthcare Decision Maker Relationship (ie \"Primary\")                             Transition of Care Plan:                    1. Pt's daughter to transport home at d/c  2. General surgery consult  3. CM following for any needs prior to d/c    Care Management Interventions  PCP Verified by CM: Yes  Mode of Transport at Discharge: Other (see comment) (daughter)  Discharge Durable Medical Equipment:  (has canes, grabbars and shower chair at home)  Current Support Network: Own Home, Family Lives Nearby, Lives Alone (1 story house with daylight basement; 2-3 entry steps)  Confirm Follow Up Transport: Family  Discharge Location  Discharge Placement: Home with family assistance  GARRETT Vila

## 2021-07-12 NOTE — CONSULTS
[address]  [facility] [place] [address]  [phone]    Hospitalist Consult Note      NAME:  Charles Lezama   :   1940   MRN:  [id number]     Requesting Physician Pamela Goodman MD   Reason for Consult:  HTN     PCP:  Brandt Valle MD     Date of service:  2021 11:37 AM       Assessment and plan:   1. HTN (hypertension) (2021). BP is stable. On losartan. 2.  GERD (gastroesophageal reflux disease) (2021). Will start PPI    3. Hyperlipidemia (2021). On crestor    4. Depression. On pristiq     5. SBO (small bowel obstruction) ([facility] [address] (2021). Started on clears. Management per surgery    Thank you for the consult. Dr Frances Carlos will follow along with you             Subjective:     CHIEF COMPLAINT: abdominal pain, nausea and vomiting      HISTORY OF PRESENT ILLNESS:     Ms. Kandy Stoner is a [de-identified] y.o.  female who is admitted to the surgical Service with SBO. We are asked to evaluate for medical managment. Ms. Kandy Stoner with PMH of HTN, hyperlipidemia, depression presented with c/o abdominal pain, nausea and vomiting. The abdominal pain is sharp, severe in intensity more on the LLQ area. It is associated with vomiting. Past Medical History:   Diagnosis Date    Colon cancer [facility]) 2007    Essential hypertension     Family history of skin cancer     Heart abnormalities     Ill-defined condition     shingles right chest    Neurological disorder     Janaes Jumana        Past Surgical History:   Procedure Laterality Date    COLONOSCOPY N/A 2017    COLONOSCOPY performed by Abel Mercer MD at [address] [facility] HX CHOLECYSTECTOMY      HX GI      colon resection for colon CA    HX GYN      hysterectomy       Social History     Tobacco Use    Smoking status: Never Smoker    Smokeless tobacco: Never Used   Substance Use Topics    Alcohol use: No        No family history on file.      Allergies   Allergen Reactions    Contrast Dye [Iodine] Rash    Morphine Shortness of Breath        Prior to Admission medications    Medication Sig Start Date End Date Taking? Authorizing Provider   aspirin delayed-release 81 mg tablet Take 81 mg by mouth daily. Indications: treatment to prevent a heart attack   Yes Provider, Historical   losartan (COZAAR) 100 mg tablet Take 100 mg by mouth daily. Yes Provider, Historical   rosuvastatin (CRESTOR) 10 mg tablet Take 10 mg by mouth nightly. Yes Provider, Historical   Desvenlafaxine SR (PRISTIQ) 50 mg tablet Take 50 mg by mouth. Yes Provider, Historical   butalbital-acetaminophen-caff (FIORICET) -40 mg per capsule Take 1 Cap by mouth every four (4) hours as needed for Pain.   Patient not taking: Reported on 7/11/2021 1/4/20   Donald Painter DO         Current Facility-Administered Medications:     hydrALAZINE (APRESOLINE) 20 mg/mL injection 10 mg, 10 mg, IntraVENous, Q6H PRN, Justin Ray MD    lactated Ringers infusion, 100 mL/hr, IntraVENous, CONTINUOUS, Dallas PARSONS MD, Last Rate: 100 mL/hr at 07/12/21 1136, 100 mL/hr at 07/12/21 1136    HYDROmorphone (DILAUDID) syringe 0.5 mg, 0.5 mg, IntraVENous, Q3H PRN, Dallas Robledo MD, 0.5 mg at 07/11/21 2201    ondansetron [facility]) injection 4 mg, 4 mg, IntraVENous, Q6H PRN, Dallas Robledo MD, 4 mg at 07/11/21 2201      Review of Systems:  (bold if positive, if negative)    Gen:  Eyes:  ENT:  CVS:  Pulm:  GI:  :  MS:  Skin:  Psych:  Endo:  Hem:  Renal:  Neuro:            Objective:      VITALS:    Vital signs reviewed; most recent are:    Visit Vitals  BP (!) 107/56 (BP 1 Location: Left upper arm, BP Patient Position: At rest)   Pulse 66   Temp 98.9 °F (37.2 °C)   Resp 16   Ht 5' 2\" (1.575 m)   Wt 67.7 kg (149 lb 4 oz)   SpO2 93%   BMI 27.30 kg/m²     SpO2 Readings from Last 6 Encounters:   07/12/21 93%   01/04/20 95%   06/17/19 97%   07/19/17 97%   05/19/14 97%            Intake/Output Summary (Last 24 hours) at 7/12/2021 1137  Last data filed at 7/12/2021 0809  Gross per 24 hour   Intake 2273.33 ml   Output    Net 2273.33 ml            Exam:     Physical Exam:    Gen:  Well-developed, well-nourished, in no acute distress  HEENT:  Pink conjunctivae, PERRL, hearing intact to voice, moist mucous membranes  Neck:  Supple, without masses, thyroid non-tender  Resp:  No accessory muscle use, clear breath sounds without wheezes rales or rhonchi  Card:  No murmurs, normal S1, S2 without thrills, bruits or peripheral edema  Abd:  Soft, non-tender, non-distended, normoactive bowel sounds are present, no palpable organomegaly and no detectable hernias  Lymph:  No cervical or inguinal adenopathy  Musc:  No cyanosis or clubbing  Skin:  No rashes or ulcers, skin turgor is good  Neuro:  Cranial nerves are grossly intact, no focal motor weakness, follows commands appropriately  Psych:  Good insight, oriented to person, place and time, alert       Labs:    Recent Labs     07/12/21  0557   WBC 4.8   HGB 12.5   HCT 38.8   *     Recent Labs     07/12/21  0557 07/11/21  1237 07/11/21  1237      < > 144   K 4.2   < > 4.1   *   < > 106   CO2 28   < > 29   GLU 91   < > 120*   BUN 16   < > 14   CREA 0.81   < > 0.94   CA 7.8*   < > 9.2   ALB  --   --  4.2   TBILI  --   --  0.4   ALT  --   --  32    < > = values in this interval not displayed. Lab Results   Component Value Date/Time    Glucose (POC) 128 (H) 06/17/2019 08:14 PM     No results for input(s): PH, PCO2, PO2, HCO3, FIO2 in the last 72 hours. No results for input(s): INR, INREXT in the last 72 hours.     Telemetry reviewed:   normal sinus rhythm    Risk of deterioration: high      Total time spent with patient: 48 895 [address] discussed with: Patient, Nursing Staff and >50% of time spent in counseling and coordination of care    Discussed:  Care Plan       ___________________________________________________    Attending Physician: Elana Blank MD

## 2021-07-12 NOTE — PROGRESS NOTES
This patient was assisted with Intentional Toileting every 2 hours during this shift as appropriate. Documentation of ambulation and output reflected on Flowsheet as appropriate. Purposeful hourly rounding was completed using AIDET and 5Ps. Outcomes of PHR documented as they occurred. Bed alarm in use as appropriate. Dual Suction and ambubag in place.

## 2021-07-12 NOTE — PROGRESS NOTES
Spiritual Care Assessment/Progress Note  [address]      NAME: Nikhil Drummond      MRN: [id number]  AGE: [de-identified] y.o.  SEX: female  Oriental orthodox Affiliation: Jainism   Language: English     7/12/2021     Total Time (in minutes): 45     Spiritual Assessment begun in [facility] [address] through conversation with:         [x]Patient        [] Family    [] Friend(s)        Reason for Consult: Advance medical directive consult     Spiritual beliefs: (Please include comment if needed)     [x] Identifies with a joce tradition: Jainism        [] Supported by a joce community:            [] Claims no spiritual orientation:           [] Seeking spiritual identity:                [] Adheres to an individual form of spirituality:           [] Not able to assess:                           Identified resources for coping:      [] Prayer                               [] Music                  [] Guided Imagery     [x] Family/friends                 [] Pet visits     [] Devotional reading                         [] Unknown     [] Other:                                               Interventions offered during this visit: (See comments for more details)    Patient Interventions: Catharsis/review of pertinent events in supportive environment, Coping skills reviewed/reinforced, Affirmation of joce           Plan of Care:     [] Support spiritual and/or cultural needs    [x] Support AMD and/or advance care planning process      [] Support grieving process   [] Coordinate Rites and/or Rituals    [] Coordination with community clergy   [] No spiritual needs identified at this time   [] Detailed Plan of Care below (See Comments)  [] Make referral to Music Therapy  [] Make referral to Pet Therapy     [] Make referral to Addiction services  [] Make referral to [facility]  [] Make referral to Spiritual Care Partner  [] No future visits requested        [] Follow up upon further referrals     Comments:  responded to an in-basket request to assist Mrs. Gela Lyons with an Advance Medical Directive (AMD) on the [place] [address] unit. Mrs. Gela Lyons was awake, alert, and lying in bed when the  came into the room. She made good eye contact and greeted the  warmly. She is very hard of hearing. Mrs. Gela Lyons openly shared about her illness concerns and shared that she is feeling much better today.  continued to be a listening ear as Mrs. Gela Lyons engaged in life review, story telling and reflection, and spoke about her joce. She feels that she has has a good life and continues to be incredibly active and independent. Her remaining siblings, 3 remaining, are well into their 80's and she is hopeful she will live just as long. Her joce continues to be a source of strength and encouragement to her and she visits different churches as able. She reflected on many blessing in her life and personal stories of joce that have shown her God's goodness. She thanked the  for taking the time to listen to her.  inquired about the AMD request and Mrs. Gela Lyons shared she is interested in having more information.  reviewed the AMD form and answered her questions as needed. She shared she would like her son, Christina Stephens, and daughter, Saman Bolden, to be her primary healthcare decision makers. She declined to complete the form at this time.  left the AMD form and the Your Right To Decide brochure on [facility] bedside table per her request. She thanked the  for stopping by and is aware of the 's availability. 's are available for further support upon referral  Richy Drake. Cynthia Gomez.      Paging Service: [phone])

## 2021-07-13 PROCEDURE — 74011250637 HC RX REV CODE- 250/637: Performed by: SURGERY

## 2021-07-13 PROCEDURE — 74011250636 HC RX REV CODE- 250/636: Performed by: SURGERY

## 2021-07-13 PROCEDURE — 74011250637 HC RX REV CODE- 250/637: Performed by: INTERNAL MEDICINE

## 2021-07-13 PROCEDURE — 65270000029 HC RM PRIVATE

## 2021-07-13 RX ORDER — SULFAMETHOXAZOLE AND TRIMETHOPRIM 800; 160 MG/1; MG/1
1 TABLET ORAL EVERY 12 HOURS
Status: DISCONTINUED | OUTPATIENT
Start: 2021-07-13 | End: 2021-07-14 | Stop reason: HOSPADM

## 2021-07-13 RX ADMIN — SULFAMETHOXAZOLE AND TRIMETHOPRIM 1 TABLET: 800; 160 TABLET ORAL at 12:27

## 2021-07-13 RX ADMIN — ROSUVASTATIN CALCIUM 10 MG: 10 TABLET, COATED ORAL at 21:00

## 2021-07-13 RX ADMIN — DESVENLAFAXINE SUCCINATE 50 MG: 50 TABLET, EXTENDED RELEASE ORAL at 09:13

## 2021-07-13 RX ADMIN — SULFAMETHOXAZOLE AND TRIMETHOPRIM 1 TABLET: 800; 160 TABLET ORAL at 20:59

## 2021-07-13 RX ADMIN — SODIUM CHLORIDE, POTASSIUM CHLORIDE, SODIUM LACTATE AND CALCIUM CHLORIDE 100 ML/HR: 600; 310; 30; 20 INJECTION, SOLUTION INTRAVENOUS at 07:51

## 2021-07-13 RX ADMIN — BUTALBITAL, ACETAMINOPHEN, AND CAFFEINE 1 TABLET: 50; 325; 40 TABLET ORAL at 23:55

## 2021-07-13 RX ADMIN — LOSARTAN POTASSIUM 100 MG: 50 TABLET, FILM COATED ORAL at 09:13

## 2021-07-13 NOTE — PROGRESS NOTES
Bedside shift change report given to [facility] (oncoming nurse) by Alexandre Lynch (offgoing nurse). Report included the following information SBAR, Kardex, ED Summary, Intake/Output and Recent Results. Bedside shift change report given to Rakel(oncoming nurse) by [facility] (offgoing nurse). Report included the following information SBAR, Kardex, ED Summary, Intake/Output and Recent Results.

## 2021-07-13 NOTE — PROGRESS NOTES
Family Practice Consult and Daily Progress Note: 7/13/2021  Kymberly Mercado MD    Assessment/Plan:   1. HTN (hypertension) (7/12/2021). BP is stable. Resume home meds.       2. GERD (gastroesophageal reflux disease) (7/12/2021). Will start PPI     3. Hyperlipidemia (7/12/2021). On crestor     4. Depression. On pristiq     5. SBO (small bowel obstruction) ([facility] [address] (7/11/2021). Started on clears. Management per surgery        Problem List:  Problem List as of 7/13/2021 Date Reviewed: 7/12/2021        Codes Class Noted - Resolved    HTN (hypertension) ICD-10-CM: I10  ICD-9-CM: 401.9  7/12/2021 - Present        Hyperlipidemia ICD-10-CM: E78.5  ICD-9-CM: 272.4  7/12/2021 - Present        GERD (gastroesophageal reflux disease) ICD-10-CM: K21.9  ICD-9-CM: 530.81  7/12/2021 - Present        SBO (small bowel obstruction) ([facility] [address] ICD-10-CM: K56.609  ICD-9-CM: 560.9  7/11/2021 - Present              Subjective:    [de-identified] y.o.  female, pt of Dr Carlos Adair in our offices, admitted to the surgical Service with SBO. We are asked to evaluate for medical managment. Ms. Kandy Stoner with PMH of HTN, hyperlipidemia, depression presented with c/o abdominal pain, nausea and vomiting. The abdominalwas initially sharp, severe in intensity more in the LLQ area and associated with nausea and vomiting. Over night the pain has lessened and now described as a dull aching in the LLQ. Also, per nurse, overnight she has tolerated liquids and had 2 BMs. At this point she can resume her home meds.   We will follow and if discharged will see her in our offices later this wk or early next wk for hospital follow up.       Past Medical History:   Diagnosis Date    Colon cancer [facility]) 2007    Essential hypertension     Family history of skin cancer     Heart abnormalities     Ill-defined condition     shingles right chest    Neurological disorder     William Denney     Past Surgical History:   Procedure Laterality Date    COLONOSCOPY N/A 2017    COLONOSCOPY performed by Moni Monet MD at [address] [facility] HX CHOLECYSTECTOMY      HX GI      colon resection for colon CA    HX GYN      hysterectomy     No family history on file. Social History     Socioeconomic History    Marital status:      Spouse name: Not on file    Number of children: Not on file    Years of education: Not on file    Highest education level: Not on file   Occupational History    Not on file   Tobacco Use    Smoking status: Never Smoker    Smokeless tobacco: Never Used   Substance and Sexual Activity    Alcohol use: No    Drug use: Never    Sexual activity: Not on file   Other Topics Concern    Not on file   Social History Narrative    Not on file     Allergies   Allergen Reactions    Contrast Dye [Iodine] Rash    Morphine Shortness of Breath       Review of Systems:   A comprehensive review of systems was negative except for that written in the HPI. Objective:   Physical Exam:   Visit Vitals  /66 (BP 1 Location: Left upper arm, BP Patient Position: At rest)   Pulse (!) 58   Temp 98 °F (36.7 °C)   Resp 17   Ht 5' 2\" (1.575 m)   Wt 67.8 kg (149 lb 8 oz)   SpO2 90%   BMI 27.34 kg/m²      O2 Device: None (Room air)  Temp (24hrs), Av.9 °F (37.2 °C), Min:98 °F (36.7 °C), Max:99.6 °F (37.6 °C)    No intake/output data recorded.  1901 -  0700  In: 1873.3 [P.O.:675; I.V.:1198.3]  Out: 400 [Urine:400]    General:  Alert, cooperative, no distress, appears stated age. Head:  Normocephalic, without obvious abnormality, atraumatic. Eyes:  Conjunctivae/corneas clear. PERRL, EOMs intact. Nose: Nares normal. Septum midline. Mucosa normal. No drainage or sinus tenderness. Throat: Lips, mucosa, and tongue moist..   Neck: Supple, symmetrical, trachea midline, no adenopathy, thyroid: no enlargement/tenderness/nodules, no carotid bruit and no JVD. Lungs:   Clear to auscultation bilaterally.    Chest wall:  No tenderness or deformity. Heart:  Regular rate and rhythm, S1, S2 normal, no murmur, click, rub or gallop. Abdomen:   Soft, with mild LLQ tenderness without rebound/guarding. Bowel sounds normal. No masses,  No organomegaly. Extremities: no cyanosis or edema. No calf tenderness or cords. Pulses: 2+ and symmetric all extremities. Skin: Skin color, texture, turgor normal. No rashes or lesions   Neurologic: Diminished hearing as usual.  Alert and oriented X 3. Fine motor of hands and fingers normal.   equal.  No cogwheeling or rigidity. Gait not tested at this time. Sensation grossly normal to touch. Gross motor of extremities normal.       Data Review:       Recent Days:  Recent Labs     07/12/21  0557 07/11/21  1237   WBC 4.8 6.9   HGB 12.5 14.6   HCT 38.8 43.6   * 169     Recent Labs     07/12/21  0557 07/11/21  1237    144   K 4.2 4.1   * 106   CO2 28 29   GLU 91 120*   BUN 16 14   CREA 0.81 0.94   CA 7.8* 9.2   ALB  --  4.2   TBILI  --  0.4   ALT  --  32     No results for input(s): PH, PCO2, PO2, HCO3, FIO2 in the last 72 hours. 24 Hour Results:  Recent Results (from the past 24 hour(s))   URINALYSIS W/ RFLX MICROSCOPIC    Collection Time: 07/12/21  4:12 PM   Result Value Ref Range    Color YELLOW/STRAW      Appearance CLEAR CLEAR      Specific gravity >1.030 (H) 1.003 - 1.030    pH (UA) 5.0 5.0 - 8.0      Protein Negative NEG mg/dL    Glucose Negative NEG mg/dL    Ketone Negative NEG mg/dL    Bilirubin Negative NEG      Blood Negative NEG      Urobilinogen 0.2 0.2 - 1.0 EU/dL    Nitrites Negative NEG      Leukocyte Esterase TRACE (A) NEG     URINE CULTURE HOLD SAMPLE    Collection Time: 07/12/21  4:12 PM    Specimen: Serum   Result Value Ref Range    Urine culture hold        Urine on hold in Microbiology dept for 2 days. If unpreserved urine is submitted, it cannot be used for addtional testing after 24 hours, recollection will be required.    URINE MICROSCOPIC ONLY    Collection Time: 07/12/21  4:12 PM   Result Value Ref Range    WBC 0-4 0 - 4 /hpf    RBC 0-5 0 - 5 /hpf    Epithelial cells FEW FEW /lpf    Bacteria Negative NEG /hpf       Medications reviewed  Current Facility-Administered Medications   Medication Dose Route Frequency    losartan (COZAAR) tablet 100 mg  100 mg Oral DAILY    desvenlafaxine succinate (PRISTIQ) ER tablet 50 mg  50 mg Oral DAILY    butalbital-acetaminophen-caffeine (FIORICET, ESGIC) -40 mg per tablet 1 Tablet  1 Tablet Oral Q4H PRN    hydrALAZINE (APRESOLINE) 20 mg/mL injection 10 mg  10 mg IntraVENous Q6H PRN    rosuvastatin (CRESTOR) tablet 10 mg  10 mg Oral QHS    lactated Ringers infusion  100 mL/hr IntraVENous CONTINUOUS    HYDROmorphone (DILAUDID) syringe 0.5 mg  0.5 mg IntraVENous Q3H PRN    ondansetron (ZOFRAN) injection 4 mg  4 mg IntraVENous Q6H PRN       Care Plan discussed with: Patient and Nurse    Total time spent with patient: 30 minutes.     Evelyne Sanches MD

## 2021-07-13 NOTE — PROGRESS NOTES
Bedside shift change report given to [facility] (oncoming nurse) by Wale Brown (offgoing nurse). Report included the following information SBAR, Kardex, ED Summary and Recent Results. 2023: put consult in for spiritual care for assistance completing advance directive    TRANSFER - OUT REPORT:    Verbal report given to Ellen(name) on [facility]  being transferred to 5th floor(unit) for routine progression of care       Report consisted of patients Situation, Background, Assessment and   Recommendations(SBAR). Information from the following report(s) SBAR, Kardex, ED Summary, Intake/Output and Recent Results was reviewed with the receiving nurse. Lines:   Peripheral IV 07/11/21 Right Wrist (Active)   Site Assessment Clean, dry, & intact 07/13/21 1915   Phlebitis Assessment 0 07/13/21 1915   Infiltration Assessment 0 07/13/21 1915   Dressing Status Clean, dry, & intact 07/13/21 1915   Dressing Type Transparent;Tape 07/13/21 1915   Hub Color/Line Status Pink;Flushed;Capped 07/13/21 1915   Action Taken Open ports on tubing capped 07/13/21 1915   Alcohol Cap Used Yes 07/13/21 1915        Opportunity for questions and clarification was provided.       Patient transported with:   Registered Nurse

## 2021-07-13 NOTE — PROGRESS NOTES
0700 Bedside shift change report given to NORBERTO Ellington (oncoming nurse) by [facility], RN (offgoing nurse). Report included the following information SBAR, Kardex, ED Summary, Intake/Output, MAR and Recent Results. 1900 Bedside shift change report given to [facility], RN (oncoming nurse) by Tom Diego RN (offgoing nurse). Report included the following information SBAR, Kardex, ED Summary, Intake/Output, MAR and Recent Results. This patient was assisted with Intentional Toileting every 2 hours during this shift as appropriate. Documentation of ambulation and output reflected on Flowsheet as appropriate. Purposeful hourly rounding was completed using AIDET and 5Ps. Outcomes of PHR documented as they occurred. Bed alarm in use as appropriate. Dual Suction and ambubag in place.

## 2021-07-13 NOTE — PROGRESS NOTES
Transition of Care Plan:    RUR-9% ; will need second Medicare letter               1. Pt's daughter to transport home at d/c  2. General surgery following  3. CM following for any needs prior to d/c  4. Advance diet as tolerated; anticipate d/c on 7/14  GARRETT Cortez RN

## 2021-07-13 NOTE — PROGRESS NOTES
Assessment / Plan:   SBO, resolving. Reports searing flank pain after voiding today. Trace LE on UA yesterday. Cultures pending. Bactrim BID x 3 days. Regular diet. D/C fluids  Hospitalist for medical care. Hopefully home tomorrow. Ronald Cotton MD  [facility]  Office:  [phone]  Fax:  [phone]        General Surgery Daily Progress Note      Patient: Shaheen Baez MRN: [id number]  SSN: [id number]    YOB: 1940  Age: [de-identified] y.o. Sex: female      Admit Date: 7/11/2021 for SBO    Subjective:   Patient feels better, +BM. New cystitis type pain, radiating from left deep suprapubic abdomen to left flank. Current Facility-Administered Medications   Medication Dose Route Frequency    trimethoprim-sulfamethoxazole (BACTRIM DS, SEPTRA DS) 160-800 mg per tablet 1 Tablet  1 Tablet Oral Q12H    losartan (COZAAR) tablet 100 mg  100 mg Oral DAILY    desvenlafaxine succinate (PRISTIQ) ER tablet 50 mg  50 mg Oral DAILY    butalbital-acetaminophen-caffeine (FIORICET, ESGIC) -40 mg per tablet 1 Tablet  1 Tablet Oral Q4H PRN    hydrALAZINE (APRESOLINE) 20 mg/mL injection 10 mg  10 mg IntraVENous Q6H PRN    rosuvastatin (CRESTOR) tablet 10 mg  10 mg Oral QHS    HYDROmorphone (DILAUDID) syringe 0.5 mg  0.5 mg IntraVENous Q3H PRN    ondansetron (ZOFRAN) injection 4 mg  4 mg IntraVENous Q6H PRN        Objective:   07/13 0701 - 07/13 1900  In: 240 [P.O.:240]  Out: -   07/11 1901 - 07/13 0700  In: 1873.3 [P.O.:675; I.V.:1198.3]  Out: 400 [Urine:400]  Patient Vitals for the past 8 hrs:   BP Temp Pulse Resp SpO2   07/13/21 1040 (!) 157/77 97.9 °F (36.6 °C) 61 16 93 %   07/13/21 0757 (!) 153/76 98.1 °F (36.7 °C) 68 17 96 %   07/13/21 0702   (!) 64         Physical Exam:  General: Alert, cooperative, no distress, appears stated age.   Neck:  Supple, symmetrical, trachea midline, no adenopathy, thyroid: no                           enlargement/tenderness/nodules, no carotid bruit and no JVD. Lungs: Clear to auscultation bilaterally. Heart:  Regular rate and rhythm, S1, S2 normal, no murmur, click, rub or gallop. Abdomen: Soft, tender in low abdomen. Bowel sounds normal. No masses,  No organomegaly. Extremities: Extremities normal, atraumatic, no cyanosis or edema. Skin:  Skin color, texture, turgor normal. No rashes or lesions    Labs:   Recent Labs     07/12/21  0557   WBC 4.8   HGB 12.5   HCT 38.8   *     Recent Labs     07/12/21  0557 07/11/21  1237 07/11/21  1237      < > 144   K 4.2   < > 4.1   *   < > 106   CO2 28   < > 29   GLU 91   < > 120*   BUN 16   < > 14   CREA 0.81   < > 0.94   CA 7.8*   < > 9.2   ALB  --   --  4.2   TBILI  --   --  0.4   ALT  --   --  32    < > = values in this interval not displayed.      ·     Active Problems:    SBO (small bowel obstruction) ([facility] [address] (7/11/2021)      HTN (hypertension) (7/12/2021)      Hyperlipidemia (7/12/2021)      GERD (gastroesophageal reflux disease) (7/12/2021)        Problem List Items Addressed This Visit     None      Visit Diagnoses     Small bowel obstruction ([facility] [address]    -  Primary

## 2021-07-14 VITALS
HEART RATE: 66 BPM | DIASTOLIC BLOOD PRESSURE: 72 MMHG | BODY MASS INDEX: 27.51 KG/M2 | RESPIRATION RATE: 16 BRPM | OXYGEN SATURATION: 96 % | HEIGHT: 62 IN | SYSTOLIC BLOOD PRESSURE: 132 MMHG | WEIGHT: 149.5 LBS | TEMPERATURE: 98.1 F

## 2021-07-14 LAB
ALBUMIN SERPL-MCNC: 3.5 G/DL (ref 3.5–5)
ALBUMIN/GLOB SERPL: 1 {RATIO} (ref 1.1–2.2)
ALP SERPL-CCNC: 100 U/L (ref 45–117)
ALT SERPL-CCNC: 34 U/L (ref 12–78)
ANION GAP SERPL CALC-SCNC: 5 MMOL/L (ref 5–15)
AST SERPL-CCNC: 25 U/L (ref 15–37)
BASOPHILS # BLD: 0 K/UL (ref 0–0.1)
BASOPHILS NFR BLD: 1 % (ref 0–1)
BILIRUB SERPL-MCNC: 0.3 MG/DL (ref 0.2–1)
BUN SERPL-MCNC: 12 MG/DL (ref 6–20)
BUN/CREAT SERPL: 13 (ref 12–20)
CALCIUM SERPL-MCNC: 8.8 MG/DL (ref 8.5–10.1)
CHLORIDE SERPL-SCNC: 109 MMOL/L (ref 97–108)
CO2 SERPL-SCNC: 26 MMOL/L (ref 21–32)
CREAT SERPL-MCNC: 0.89 MG/DL (ref 0.55–1.02)
DIFFERENTIAL METHOD BLD: ABNORMAL
EOSINOPHIL # BLD: 0.1 K/UL (ref 0–0.4)
EOSINOPHIL NFR BLD: 3 % (ref 0–7)
ERYTHROCYTE [DISTWIDTH] IN BLOOD BY AUTOMATED COUNT: 12.1 % (ref 11.5–14.5)
GLOBULIN SER CALC-MCNC: 3.4 G/DL (ref 2–4)
GLUCOSE SERPL-MCNC: 139 MG/DL (ref 65–100)
HCT VFR BLD AUTO: 39.6 % (ref 35–47)
HGB BLD-MCNC: 13.4 G/DL (ref 11.5–16)
IMM GRANULOCYTES # BLD AUTO: 0 K/UL (ref 0–0.04)
IMM GRANULOCYTES NFR BLD AUTO: 0 % (ref 0–0.5)
LYMPHOCYTES # BLD: 1 K/UL (ref 0.8–3.5)
LYMPHOCYTES NFR BLD: 22 % (ref 12–49)
MCH RBC QN AUTO: 30.6 PG (ref 26–34)
MCHC RBC AUTO-ENTMCNC: 33.8 G/DL (ref 30–36.5)
MCV RBC AUTO: 90.4 FL (ref 80–99)
MONOCYTES # BLD: 0.4 K/UL (ref 0–1)
MONOCYTES NFR BLD: 9 % (ref 5–13)
NEUTS SEG # BLD: 3 K/UL (ref 1.8–8)
NEUTS SEG NFR BLD: 65 % (ref 32–75)
NRBC # BLD: 0 K/UL (ref 0–0.01)
NRBC BLD-RTO: 0 PER 100 WBC
PLATELET # BLD AUTO: 133 K/UL (ref 150–400)
PMV BLD AUTO: 9.2 FL (ref 8.9–12.9)
POTASSIUM SERPL-SCNC: 3.8 MMOL/L (ref 3.5–5.1)
PROT SERPL-MCNC: 6.9 G/DL (ref 6.4–8.2)
RBC # BLD AUTO: 4.38 M/UL (ref 3.8–5.2)
SODIUM SERPL-SCNC: 140 MMOL/L (ref 136–145)
WBC # BLD AUTO: 4.7 K/UL (ref 3.6–11)

## 2021-07-14 PROCEDURE — 74011250637 HC RX REV CODE- 250/637: Performed by: INTERNAL MEDICINE

## 2021-07-14 PROCEDURE — 36415 COLL VENOUS BLD VENIPUNCTURE: CPT

## 2021-07-14 PROCEDURE — 85025 COMPLETE CBC W/AUTO DIFF WBC: CPT

## 2021-07-14 PROCEDURE — 77030038269 HC DRN EXT URIN PURWCK BARD -A

## 2021-07-14 PROCEDURE — 74011250637 HC RX REV CODE- 250/637: Performed by: SURGERY

## 2021-07-14 PROCEDURE — 80053 COMPREHEN METABOLIC PANEL: CPT

## 2021-07-14 RX ORDER — SULFAMETHOXAZOLE AND TRIMETHOPRIM 800; 160 MG/1; MG/1
1 TABLET ORAL EVERY 12 HOURS
Qty: 3 TABLET | Refills: 0 | Status: SHIPPED | OUTPATIENT
Start: 2021-07-14

## 2021-07-14 RX ADMIN — LOSARTAN POTASSIUM 100 MG: 50 TABLET, FILM COATED ORAL at 09:44

## 2021-07-14 RX ADMIN — SULFAMETHOXAZOLE AND TRIMETHOPRIM 1 TABLET: 800; 160 TABLET ORAL at 09:44

## 2021-07-14 RX ADMIN — DESVENLAFAXINE SUCCINATE 50 MG: 50 TABLET, EXTENDED RELEASE ORAL at 09:44

## 2021-07-14 NOTE — PROGRESS NOTES
Family Practice Consult and Daily Progress Note: 7/14/2021  Terrie Cordoba MD    Assessment/Plan:   1. HTN (hypertension) (7/12/2021). BP is stable. Resumed home meds.       2. GERD (gastroesophageal reflux disease) (7/12/2021). Will start PPI     3. Hyperlipidemia (7/12/2021). On crestor     4. Depression. On pristiq     5. SBO (small bowel obstruction) ([facility] [address] (7/11/2021). Started on clears. Management per surgery        Problem List:  Problem List as of 7/14/2021 Date Reviewed: 7/12/2021        Codes Class Noted - Resolved    HTN (hypertension) ICD-10-CM: I10  ICD-9-CM: 401.9  7/12/2021 - Present        Hyperlipidemia ICD-10-CM: E78.5  ICD-9-CM: 272.4  7/12/2021 - Present        GERD (gastroesophageal reflux disease) ICD-10-CM: K21.9  ICD-9-CM: 530.81  7/12/2021 - Present        SBO (small bowel obstruction) ([facility] [address] ICD-10-CM: K56.609  ICD-9-CM: 560.9  7/11/2021 - Present              Subjective:    [de-identified] y.o.  female, pt of Dr Collin Valderrama in our offices, admitted to the surgical Service with SBO. We are asked to evaluate for medical managment. Ms. Myriam Saenz with PMH of HTN, hyperlipidemia, depression presented with c/o abdominal pain, nausea and vomiting. The abdominalwas initially sharp, severe in intensity more in the LLQ area and associated with nausea and vomiting. Over night the pain has lessened and now described as a dull aching in the LLQ. Also, per nurse, overnight she has tolerated liquids and had 2 BMs. At this point she can resume her home meds. We will follow and if discharged will see her in our offices later this wk or early next wk for hospital follow up.      7/14:  Feeling better with little ab pain at this time. Tolerated solid foods yesterday and had BM overnight. Back on outpt meds and tolerating well.   If discharged she is to follow up in office later this wk or early next wk with Dr Collin Valderrama or other in our offices.      Past Medical History:   Diagnosis Date    Colon cancer [facility]) 2007    Essential hypertension     Family history of skin cancer     Heart abnormalities     Ill-defined condition     shingles right chest    Neurological disorder     William Denney     Past Surgical History:   Procedure Laterality Date    COLONOSCOPY N/A 2017    COLONOSCOPY performed by Monet Ocampo MD at [address] [facility] HX CHOLECYSTECTOMY      HX GI      colon resection for colon CA    HX GYN      hysterectomy     No family history on file. Social History     Socioeconomic History    Marital status:      Spouse name: Not on file    Number of children: Not on file    Years of education: Not on file    Highest education level: Not on file   Occupational History    Not on file   Tobacco Use    Smoking status: Never Smoker    Smokeless tobacco: Never Used   Substance and Sexual Activity    Alcohol use: No    Drug use: Never    Sexual activity: Not on file   Other Topics Concern    Not on file   Social History Narrative    Not on file     Allergies   Allergen Reactions    Contrast Dye [Iodine] Rash    Morphine Shortness of Breath       Review of Systems:   A comprehensive review of systems was negative except for that written in the HPI. Objective:   Physical Exam:   Visit Vitals  /77 (BP 1 Location: Left upper arm, BP Patient Position: At rest)   Pulse (!) 59   Temp 97.8 °F (36.6 °C)   Resp 18   Ht 5' 2\" (1.575 m)   Wt 67.8 kg (149 lb 8 oz)   SpO2 95%   BMI 27.34 kg/m²      O2 Device: None (Room air)  Temp (24hrs), Av.9 °F (36.6 °C), Min:97.4 °F (36.3 °C), Max:98.4 °F (36.9 °C)    No intake/output data recorded.  1901 -  0700  In: 1200 [P.O.:1200]  Out: 2100 [Urine:2100]    General:  Alert, cooperative, no distress, appears stated age. Head:  Normocephalic, without obvious abnormality, atraumatic. Eyes:  Conjunctivae/corneas clear. PERRL, EOMs intact. Nose: Nares normal. Septum midline. Mucosa normal. No drainage or sinus tenderness. Throat: Lips, mucosa, and tongue moist..   Neck: Supple, symmetrical, trachea midline, no adenopathy, thyroid: no enlargement/tenderness/nodules, no carotid bruit and no JVD. Lungs:   Clear to auscultation bilaterally. Chest wall:  No tenderness or deformity. Heart:  Regular rate and rhythm, S1, S2 normal, no murmur, click, rub or gallop. Abdomen:   Soft, with mild LLQ tenderness without rebound/guarding. Bowel sounds normal. No masses,  No organomegaly. Extremities: no cyanosis or edema. No calf tenderness or cords. Pulses: 2+ and symmetric all extremities. Skin: Skin color, texture, turgor normal. No rashes or lesions   Neurologic: Diminished hearing as usual.  Alert and oriented X 3. Fine motor of hands and fingers normal.   equal.  No cogwheeling or rigidity. Gait not tested at this time. Sensation grossly normal to touch. Gross motor of extremities normal.       Data Review:       Recent Days:  Recent Labs     07/12/21  0557 07/11/21  1237   WBC 4.8 6.9   HGB 12.5 14.6   HCT 38.8 43.6   * 169     Recent Labs     07/12/21  0557 07/11/21  1237    144   K 4.2 4.1   * 106   CO2 28 29   GLU 91 120*   BUN 16 14   CREA 0.81 0.94   CA 7.8* 9.2   ALB  --  4.2   TBILI  --  0.4   ALT  --  32     No results for input(s): PH, PCO2, PO2, HCO3, FIO2 in the last 72 hours. 24 Hour Results:  No results found for this or any previous visit (from the past 24 hour(s)).     Medications reviewed  Current Facility-Administered Medications   Medication Dose Route Frequency    trimethoprim-sulfamethoxazole (BACTRIM DS, SEPTRA DS) 160-800 mg per tablet 1 Tablet  1 Tablet Oral Q12H    losartan (COZAAR) tablet 100 mg  100 mg Oral DAILY    desvenlafaxine succinate (PRISTIQ) ER tablet 50 mg  50 mg Oral DAILY    butalbital-acetaminophen-caffeine (FIORICET, ESGIC) -40 mg per tablet 1 Tablet  1 Tablet Oral Q4H PRN    hydrALAZINE (APRESOLINE) 20 mg/mL injection 10 mg  10 mg IntraVENous Q6H PRN    rosuvastatin (CRESTOR) tablet 10 mg  10 mg Oral QHS    HYDROmorphone (DILAUDID) syringe 0.5 mg  0.5 mg IntraVENous Q3H PRN    ondansetron (ZOFRAN) injection 4 mg  4 mg IntraVENous Q6H PRN       Care Plan discussed with: Patient and Nurse    Total time spent with patient: 30 minutes.     Tanner De Jesus MD

## 2021-07-14 NOTE — PROGRESS NOTES
Problem: Falls - Risk of  Goal: *Absence of Falls  Description: Document Thelma Locket Fall Risk and appropriate interventions in the flowsheet.   Outcome: Progressing Towards Goal  Note: Fall Risk Interventions:            Medication Interventions: Patient to call before getting OOB    Elimination Interventions: Bed/chair exit alarm, Call light in reach, Toileting schedule/hourly rounds

## 2021-07-14 NOTE — ACP (ADVANCE CARE PLANNING)
Advance Care Planning   Advance Care Planning Inpatient Note  [email or web address]    Today's Date: 7/14/2021  Unit: [facility] 5M1 MED SURG 1    Received request from [employer] . Upon review of chart and communication with care team, patient's decision making abilities are not in question. Patient was/were present in the room during visit. Goals of ACP Conversation:  Discuss Advance Care planning documents  [employer] & sign AMD Form    Health Care Decision Makers:      Primary Decision Maker: Cynthia Whitehead - Daughter - [phone]    Secondary Decision Maker: Margaret Conte - Son - [phone]  Click here to complete [place] including selection of the Healthcare Decision Maker Relationship (ie \"Primary\")     Today we:  Verified [address] (Patient Wishes) on file:  Healthcare Power of /Advance Directive appointment of Health care agent  Living Will/ Advance Directive     Assessment: This visit was requested by patient as a consult to have the Advanced Medical Directive (AMD) form that she was previously given. Of which she and her daughter, Harish Eugene, had filled out and wanted to turn in to have put in her files.  reviewed the form with Mrs. Conda Soulier and verified that the information is correct. The blank sections they wanted to keep blank.  had patient put a line through the blank sections. Mrs. Conda Soulier confirmed that she wants her daughter, Harish Eugene, to be primary decision maker, and her son, Noemí Jean, as secondary. Mrs. Conda Soulier said that her friend, Myriam Heather can remain as an emergency contact only.  had attending nurse and himself witness Mrs. Conda Soulier sign the AMD form, and  made copies of the form and gave Mrs. Conda Soulier the original and two copies and had one copy scanned into her medical records. Advised nurse to contact [facility] for any further referrals.     Chaplain Berto Zapata M.Div.  Francheska Del Cid ([id number]) Interventions:  Assisted in the completion of documents according to patient's wishes at this time  Returned original document(s) to patient, as well as copies for distribution to appointed agents     Outcomes/Plan:  New Advance Directive completed     Electronically signed by Chaplain Miriam on 7/14/2021 at 10:57 AM

## 2021-07-14 NOTE — DISCHARGE SUMMARY
Discharge Summary    Patient: Jaja Duvall               Sex: female          DOA: [unfilled]       YOB: 1940      Age:  [de-identified] y.o.        LOS:  LOS: 3 days                Admit Date: 7/11/2021    Discharge Date: 7/14/2021    Admission Diagnoses: SBO (small bowel obstruction) ([facility] [K56.609]    Discharge Diagnoses:    Problem List as of 7/14/2021 Date Reviewed: 7/12/2021        Codes Class Noted - Resolved    HTN (hypertension) ICD-10-CM: I10  ICD-9-CM: 401.9  7/12/2021 - Present        Hyperlipidemia ICD-10-CM: E78.5  ICD-9-CM: 272.4  7/12/2021 - Present        GERD (gastroesophageal reflux disease) ICD-10-CM: K21.9  ICD-9-CM: 530.81  7/12/2021 - Present        SBO (small bowel obstruction) ([facility] ICD-10-CM: Y72.107  ICD-9-CM: 560.9  7/11/2021 - Present              Discharge Condition: Stable    Hospital Course: [de-identified] yo female admitted to the hospital with SBO. She was treated conservatively and improved with bowel rest.  Her diet was advanced and she did well. She will be d/c home with f/u with her PCP. Consults: Hospitalist    Significant Diagnostic Studies: labs: and radiology: CT scan:    Discharge Medications:     Current Discharge Medication List      START taking these medications    Details   trimethoprim-sulfamethoxazole (BACTRIM DS, SEPTRA DS) 160-800 mg per tablet Take 1 Tablet by mouth every twelve (12) hours. Qty: 3 Tablet, Refills: 0         CONTINUE these medications which have NOT CHANGED    Details   aspirin delayed-release 81 mg tablet Take 81 mg by mouth daily. Indications: treatment to prevent a heart attack      losartan (COZAAR) 100 mg tablet Take 100 mg by mouth daily. rosuvastatin (CRESTOR) 10 mg tablet Take 10 mg by mouth nightly. Desvenlafaxine SR (PRISTIQ) 50 mg tablet Take 50 mg by mouth. butalbital-acetaminophen-caff (FIORICET) -40 mg per capsule Take 1 Cap by mouth every four (4) hours as needed for Pain.   Qty: 20 Cap, Refills: 0 Activity: Activity as tolerated    Diet: Regular Diet    Wound Care: None needed    Follow-up: Dr. Tricia Seymour your PCP in 3-5 days.

## 2021-07-14 NOTE — DISCHARGE INSTRUCTIONS
Patient Education        Bowel Blockage (Intestinal Obstruction): Care Instructions  Your Care Instructions  A bowel blockage, also called an intestinal obstruction, can prevent gas, fluids, or solids from moving through the intestines normally. It can cause constipation and, rarely, diarrhea. You may have pain, nausea, vomiting, and cramping. Most of the time, complete blockages require a stay in the hospital and possibly surgery. But if your bowel is only partly blocked, your doctor may tell you to wait until it clears on its own and you are able to pass gas and stool. If so, there are things you can do at home to help make you feel better. If you have had surgery for a bowel blockage, there are things you can do at home to make sure you heal well. You can also make some changes to keep your bowel from becoming blocked again. Follow-up care is a key part of your treatment and safety. Be sure to make and go to all appointments, and call your doctor if you are having problems. It's also a good idea to know your test results and keep a list of the medicines you take. How can you care for yourself at home? If your doctor has told you to wait at home for a blockage to clear on its own:  · Follow your doctor's instructions. These may include eating a liquid diet to avoid complete blockage. · Be safe with medicines. Take your medicines exactly as prescribed. Call your doctor if you think you are having a problem with your medicine. · Put a heating pad set on low on your belly to relieve mild cramps and pain. To prevent another blockage  · Try to eat smaller amounts of food more often. For example, have 5 or 6 small meals throughout the day instead of 2 or 3 large meals. · Chew your food very well. Try to chew each bite about 20 times or until it is liquid. · Avoid high-fiber foods and raw fruits and vegetables with skins, husks, strings, or seeds.  These can form a ball of undigested material that can cause a blockage if a part of your bowel is scarred or narrowed. · Check with your doctor before you eat whole-grain products or use a fiber supplement such as Citrucel or Metamucil. · To help you have regular bowel movements, eat at regular times, do not strain during a bowel movement, and drink plenty of water. If you have kidney, heart, or liver disease and have to limit fluids, talk with your doctor before you increase the amount of fluids you drink. · Drink high-calorie liquid formulas if your doctor says to. Severe symptoms may make it hard for your body to take in vitamins and minerals. · Get regular exercise. It helps you digest your food better. Get at least 30 minutes of physical activity on most days of the week. Walking is a good choice. When should you call for help? Call your doctor now or seek immediate medical care if:    · You have a fever.     · You are vomiting.     · You have new or worse belly pain.     · You cannot pass stools or gas. Watch closely for changes in your health, and be sure to contact your doctor if you have any problems. Where can you learn more? Go to http://www.gray.com/  Enter B082 in the search box to learn more about \"Bowel Blockage (Intestinal Obstruction): Care Instructions. \"  Current as of: April 15, 2020               Content Version: 12.8  © 2006-2021 [employer]. Care instructions adapted under license by [employer] (which disclaims liability or warranty for this information). If you have questions about a medical condition or this instruction, always ask your healthcare professional. [place] [address] any warranty or liability for your use of this information. [facility] Follow-Up Instructions/Information    You may have an in home follow up visit set up with [facility] or may wish to contact [facility] to set-up a visit:    What are we?   [facility] is an in-home urgent care service staffed with emergency trained medical teams. We come to your home in a vehicle stocked with medical supplies and technology. An ER physician is always available if needed. When? As a part of your hospital follow-up, an appointment has been/ or can be set up for us to come see you. Usually, this will be 24-72 hours after you leave the hospital or as needed. [employer] is open 7am-9pm, 7 days a week, 365 days a year, including holidays. Why? We know that you cannot always get to your doctor after being in the hospital and that your doctor is not always available when you need them. Once your workup is complete, we'll call in your prescriptions, update your family doctor, and handle billing with your insurance so you can focus on feeling better, faster without leaving home. How much? We accept most major health insurance plans, including Medicaid, Medicare, and Medicare Advantage [address] [employer], [employer], [place], and [employer]. We also accept: credit, debit, health savings account (HSA), health reimbursement account (HRA) and flexible spending account (FSA) payments. [employer]'s prices compare to conventional urgent care facilities, but we bring the care to you. How to reach us? Getting care is easy- use our mobile lalit ([employer]), website ([email or web address]) or call us [phone].

## 2021-07-14 NOTE — PROGRESS NOTES
AVS updated and printed. Discharge medications reviewed with patient and appropriate educational materials and side effects teaching were provided. I have reviewed discharge instructions with the patient and she  verbalized understanding. Leave smart education provided to patient and she  verbalized understanding. AVS signed and given to patient. PIV removed. Patient's daughter on way to hospital to drive her home.

## 2021-07-14 NOTE — PROGRESS NOTES
This visit was requested by patient as a consult to have the Advanced Medical Directive (AMD) form that she was previously given. Of which she and her daughter, Black Joaquin, had filled out and wanted to turn in to have put in her files.  reviewed the form with Mrs. Shelley Maurice and verified that the information is correct. The blank sections they wanted to keep blank.  had patient put a line through the blank sections. Mrs. Shelley Maurice confirmed that she wants her daughter, Black Joaquin, to be primary decision maker, and her son, Abigail Ac, as secondary. Mrs. Shelley Maurice said that her friend, Jai Castro can remain as an emergency contact only.  had attending nurse and himself witness Mrs. Shelley Maurice sign the AMD form, and  made copies of the form and gave Mrs. Shelley Maurice the original and two copies and had one copy scanned into her medical records. Mrs. Shelley Maurice warmly welcomed visit as she was sitting in chair with news paper. She was excited that she was able to be discharged today. She shared about her and her husbands journey together and they both were ill together and how he  and how the children loved him and how they care for her. She lite up with norbert as she talked about her grandchildren. She openly talked about her strong joce and how she was raised [place] and now attends a [employer] mostly. She expressed that she likes to visit different churches in her area. The only time she seemed saddened was mentioning her daughters divorce and that her daughters x- is not a [place] and how this divorce effects the children.  provided time for her to share and express her feelings on these things. She expressed that she believes in the power of prayer and will cover them all in prayer.  affirmed her feels as valid and normal and assured her that he would keep her request for continued prayer for her and her family. Advised nurse to contact [facility] for any further referrals.      Justin Condon M.Div.  [facility] ([id number])

## 2021-07-14 NOTE — PROGRESS NOTES
Bedside and Verbal shift change report given to Roland Munoz (oncoming nurse) by Yuliya Terry (offgoing nurse). Report included the following information SBAR, Kardex, Intake/Output, MAR and Recent Results.

## 2021-07-14 NOTE — PROGRESS NOTES
7/14/2021  Case Management Progress Note    11:09 AM  Patient is [de-identified]year old female admitted 7/11 with SBO  Patient's RUR is 9% green/low risk of readmission  Chart reviewed--patient discussed in IDR rounds this morning  Received patient from [facility] this morning. Per chart review, likely discharge today, home with family assistance. No needs noted at this time. Transition of Care Plan  1. Continue medical management/treatment  2. Potentially home today pending surgical clearance. 3. Patient's daughter will transport home  4. No CM needs noted at this time  5. Follow up with PCP, specialties as needed  6.  CM will continue to follow    TAMRA Saini

## 2022-01-04 ENCOUNTER — HOSPITAL ENCOUNTER (EMERGENCY)
Age: 82
Discharge: HOME | End: 2022-01-04
Attending: EMERGENCY MEDICINE
Payer: MEDICARE

## 2022-01-04 ENCOUNTER — APPOINTMENT (OUTPATIENT)
Dept: GENERAL RADIOLOGY | Age: 82
End: 2022-01-04
Attending: EMERGENCY MEDICINE
Payer: MEDICARE

## 2022-01-04 VITALS
OXYGEN SATURATION: 97 % | SYSTOLIC BLOOD PRESSURE: 179 MMHG | HEIGHT: 62 IN | BODY MASS INDEX: 25.76 KG/M2 | WEIGHT: 140 LBS | DIASTOLIC BLOOD PRESSURE: 90 MMHG | RESPIRATION RATE: 20 BRPM | HEART RATE: 75 BPM | TEMPERATURE: 98.7 F

## 2022-01-04 DIAGNOSIS — R05.9 COUGH: Primary | ICD-10-CM

## 2022-01-04 DIAGNOSIS — Z20.822 SUSPECTED COVID-19 VIRUS INFECTION: ICD-10-CM

## 2022-01-04 LAB
FLUAV AG NPH QL IA: NEGATIVE
FLUBV AG NOSE QL IA: NEGATIVE
SARS-COV-2, COV2: NORMAL

## 2022-01-04 PROCEDURE — U0005 INFEC AGEN DETEC AMPLI PROBE: HCPCS

## 2022-01-04 PROCEDURE — 87804 INFLUENZA ASSAY W/OPTIC: CPT

## 2022-01-04 PROCEDURE — 71046 X-RAY EXAM CHEST 2 VIEWS: CPT

## 2022-01-04 PROCEDURE — 99282 EMERGENCY DEPT VISIT SF MDM: CPT

## 2022-01-04 RX ORDER — DEXAMETHASONE 6 MG/1
TABLET ORAL
Qty: 7 TABLET | Refills: 0 | Status: SHIPPED | OUTPATIENT
Start: 2022-01-04

## 2022-01-04 RX ORDER — GUAIFENESIN 1200 MG/1
1200 TABLET, EXTENDED RELEASE ORAL 2 TIMES DAILY
Qty: 12 TABLET | Refills: 0 | Status: SHIPPED | OUTPATIENT
Start: 2022-01-04

## 2022-01-04 NOTE — ED PROVIDER NOTES
HPI the patient has a 2-day history of cough, mild shortness of breath and low-grade fever. She has been vaccinated for Covid. She has not had a flu shot. She denies having chest pain, nausea/vomiting or other complaints. Past Medical History:   Diagnosis Date    Colon cancer [facility]) 2007    Essential hypertension     Family history of skin cancer     Heart abnormalities     Ill-defined condition     shingles right chest    Neurological disorder     William Denney       Past Surgical History:   Procedure Laterality Date    COLONOSCOPY N/A 7/19/2017    COLONOSCOPY performed by Jeff Pruitt MD at [facility] HX CHOLECYSTECTOMY      HX GI      colon resection for colon CA    HX GYN      hysterectomy         No family history on file. Social History     Socioeconomic History    Marital status:      Spouse name: Not on file    Number of children: Not on file    Years of education: Not on file    Highest education level: Not on file   Occupational History    Not on file   Tobacco Use    Smoking status: Never Smoker    Smokeless tobacco: Never Used   Substance and Sexual Activity    Alcohol use: No    Drug use: Never    Sexual activity: Not on file   Other Topics Concern    Not on file   Social History Narrative    Not on file     Social Determinants of Health     Financial Resource Strain:     Difficulty of Paying Living Expenses: Not on file   Food Insecurity:     Worried About Running Out of Food in the Last Year: Not on file    Matias of Food in the Last Year: Not on file   Transportation Needs:     Lack of Transportation (Medical): Not on file    Lack of Transportation (Non-Medical):  Not on file   Physical Activity:     Days of Exercise per Week: Not on file    Minutes of Exercise per Session: Not on file   Stress:     Feeling of Stress : Not on file   Social Connections:     Frequency of Communication with Friends and Family: Not on file    Frequency of Social Gatherings with Friends and Family: Not on file    Attends Yazidism Services: Not on file    Active Member of Clubs or Organizations: Not on file    Attends Club or Organization Meetings: Not on file    Marital Status: Not on file   Intimate Partner Violence:     Fear of Current or Ex-Partner: Not on file    Emotionally Abused: Not on file    Physically Abused: Not on file    Sexually Abused: Not on file   Housing Stability:     Unable to Pay for Housing in the Last Year: Not on file    Number of [place] in the Last Year: Not on file    Unstable Housing in the Last Year: Not on file         ALLERGIES: Contrast dye [iodine] and Morphine    Review of Systems   Constitutional: Positive for fever. HENT: Negative for voice change. Eyes: Negative for visual disturbance. Respiratory: Positive for cough and shortness of breath. Cardiovascular: Negative for chest pain. Gastrointestinal: Negative for abdominal pain, nausea and vomiting. Genitourinary: Negative for flank pain. Musculoskeletal: Negative for back pain. Skin: Negative for rash. Neurological: Negative for headaches. Psychiatric/Behavioral: Negative for confusion. There were no vitals filed for this visit. Physical Exam  Constitutional:       General: She is not in acute distress. Appearance: She is well-developed. HENT:      Head: Normocephalic. Cardiovascular:      Rate and Rhythm: Normal rate. Heart sounds: No murmur heard. Pulmonary:      Effort: Pulmonary effort is normal.      Breath sounds: Normal breath sounds. Abdominal:      Palpations: Abdomen is soft. Tenderness: There is no abdominal tenderness. Musculoskeletal:         General: Normal range of motion. Cervical back: Normal range of motion. Skin:     General: Skin is warm and dry. Capillary Refill: Capillary refill takes less than 2 seconds. Neurological:      Mental Status: She is alert.    Psychiatric: Behavior: Behavior normal.          MDM       Procedures

## 2022-01-05 ENCOUNTER — PATIENT OUTREACH (OUTPATIENT)
Dept: CASE MANAGEMENT | Age: 82
End: 2022-01-05

## 2022-01-05 NOTE — ED NOTES
Bedside shift change report given to Harish Davison RN  (oncoming nurse) by NORBERTO HERNANDEZ  (offgoing nurse). Report included the following information SBAR.

## 2022-01-05 NOTE — PROGRESS NOTES
Ambulatory Care Management Notes    Date/Time:  1/6/2022 3:45 PM  Several unsuccessful attempts have been made to contact this patient today. Left HIPPA compliant messages requesting a return call from pt. Patient resolved from Transition of Care episode on 01/06/22. ACM/CTN was unsuccessful at contacting this patient today. Patient has not had any additional ED or hospital visits. No further outreach scheduled with this CTN/ACM. Episode of Care resolved. Patient has this CTN/ACM contact information if future needs arise.  /dla    Date/Time:  1/5/2022 3:45 PM  COVID-19 related testing is still pending at this time. Will con't to monitor for result.  /dla      Date/Time:  1/5/2022 12:07 PM  Ambulatory Care Manager outreached to patient today by telephone to perform a follow-up assessment on her r/o [facility] ED visit 1/04/22 at [facility]. Left (2) HIPPA compliant messages requesting a return call from pt. Ambulatory Care Manager's contact number provided. Will attempt to reach patient again later (COVID result: pend).

## 2022-01-06 LAB
SARS-COV-2, XPLCVT: DETECTED
SOURCE, COVRS: ABNORMAL

## 2022-01-07 NOTE — PROGRESS NOTES
I called and spoke with patient daughter concerning covid results. Discussed self quarantine, questions answered, reviewed reasons/signs/symptoms for return to ER.

## 2022-01-28 NOTE — PROGRESS NOTES
1900- Bedside and Verbal shift change report given to Jason Santa (oncoming nurse) by Cassandra Pascual (offgoing nurse). Report included the following information SBAR, Intake/Output, Recent Results and Cardiac Rhythm SB to NSR. This patient was assisted with Intentional Toileting every 2 hours during this shift as appropriate. Documentation of ambulation and output reflected on Flowsheet as appropriate. Purposeful hourly rounding was completed using AIDET and 5Ps. Outcomes of PHR documented as they occurred. Bed alarm in use as appropriate. Dual Suction and ambubag in place. 2300- Bedside and Verbal shift change report given to [facility] (oncoming nurse) by Jason Santa (offgoing nurse). Report included the following information SBAR, Intake/Output, Recent Results and Cardiac Rhythm SB to NSR. no

## 2022-03-19 PROBLEM — K56.609 SBO (SMALL BOWEL OBSTRUCTION) (HCC): Status: ACTIVE | Noted: 2021-07-11

## 2022-03-19 PROBLEM — I10 HTN (HYPERTENSION): Status: ACTIVE | Noted: 2021-07-12

## 2022-03-19 PROBLEM — K21.9 GERD (GASTROESOPHAGEAL REFLUX DISEASE): Status: ACTIVE | Noted: 2021-07-12

## 2022-03-20 PROBLEM — E78.5 HYPERLIPIDEMIA: Status: ACTIVE | Noted: 2021-07-12

## 2022-12-20 LAB — EF %, EXTERNAL: NORMAL

## 2023-02-02 ENCOUNTER — OFFICE VISIT (OUTPATIENT)
Dept: FAMILY MEDICINE CLINIC | Age: 83
End: 2023-02-02
Payer: MEDICARE

## 2023-02-02 ENCOUNTER — LAB ONLY (OUTPATIENT)
Dept: FAMILY MEDICINE CLINIC | Age: 83
End: 2023-02-02
Payer: MEDICARE

## 2023-02-02 VITALS
HEIGHT: 62 IN | BODY MASS INDEX: 28.26 KG/M2 | RESPIRATION RATE: 16 BRPM | OXYGEN SATURATION: 98 % | TEMPERATURE: 97.3 F | SYSTOLIC BLOOD PRESSURE: 150 MMHG | DIASTOLIC BLOOD PRESSURE: 84 MMHG | HEART RATE: 72 BPM | WEIGHT: 153.6 LBS

## 2023-02-02 DIAGNOSIS — E55.9 VITAMIN D DEFICIENCY: ICD-10-CM

## 2023-02-02 DIAGNOSIS — I10 PRIMARY HYPERTENSION: ICD-10-CM

## 2023-02-02 DIAGNOSIS — E78.2 MIXED HYPERLIPIDEMIA: ICD-10-CM

## 2023-02-02 DIAGNOSIS — E04.1 THYROID NODULE: ICD-10-CM

## 2023-02-02 DIAGNOSIS — Z91.81 AT MODERATE RISK FOR FALL: ICD-10-CM

## 2023-02-02 DIAGNOSIS — E04.1 THYROID NODULE: Primary | ICD-10-CM

## 2023-02-02 PROCEDURE — G8427 DOCREV CUR MEDS BY ELIG CLIN: HCPCS | Performed by: INTERNAL MEDICINE

## 2023-02-02 PROCEDURE — 99203 OFFICE O/P NEW LOW 30 MIN: CPT | Performed by: INTERNAL MEDICINE

## 2023-02-02 PROCEDURE — 3078F DIAST BP <80 MM HG: CPT | Performed by: INTERNAL MEDICINE

## 2023-02-02 PROCEDURE — G8536 NO DOC ELDER MAL SCRN: HCPCS | Performed by: INTERNAL MEDICINE

## 2023-02-02 PROCEDURE — 1123F ACP DISCUSS/DSCN MKR DOCD: CPT | Performed by: INTERNAL MEDICINE

## 2023-02-02 PROCEDURE — 1101F PT FALLS ASSESS-DOCD LE1/YR: CPT | Performed by: INTERNAL MEDICINE

## 2023-02-02 PROCEDURE — G8400 PT W/DXA NO RESULTS DOC: HCPCS | Performed by: INTERNAL MEDICINE

## 2023-02-02 PROCEDURE — 3074F SYST BP LT 130 MM HG: CPT | Performed by: INTERNAL MEDICINE

## 2023-02-02 PROCEDURE — 1090F PRES/ABSN URINE INCON ASSESS: CPT | Performed by: INTERNAL MEDICINE

## 2023-02-02 PROCEDURE — G8510 SCR DEP NEG, NO PLAN REQD: HCPCS | Performed by: INTERNAL MEDICINE

## 2023-02-02 PROCEDURE — G8417 CALC BMI ABV UP PARAM F/U: HCPCS | Performed by: INTERNAL MEDICINE

## 2023-02-02 PROCEDURE — G0463 HOSPITAL OUTPT CLINIC VISIT: HCPCS | Performed by: INTERNAL MEDICINE

## 2023-02-02 NOTE — PROGRESS NOTES
CHIEF COMPLAINT:   Chief Complaint   Patient presents with    Establish Care    Elevated Blood Pressure    Dizziness     Has had for about 3 years        HISTORY OF PRESENT ILLNESS:   82F who presents as a new patient to [facility]. She is here to establish care. Previous PCP: Margi Rodarte  Last visit with them: 2-months ago saw Dr. Rayna Hernandez  Concerns:  Elevated BP  Dizziness for years. ? If any evaluation ENT? Neurology? Cardiology? Had CT scan in 2021 of thyroid and found to have a calcified thyroid nodule. She has a suspicious lesion on her L-hand that is concerning for cancer. She does have an appointment with [facility] on 2/7/2023. She also has a h/o colon cancer. Her last GI was Dr. Vertis Oppenheim and had EGD and repeat colonoscopy with him. We need that report. She also was followed by Dr. Aida Weems (oncology) [facility]. We will get his note as well. I have has  [address] HISTORY:  Past Medical History:   Diagnosis Date    Colon cancer [facility]) 2007    Essential hypertension     Family history of skin cancer     Heart abnormalities     Ill-defined condition     shingles right chest    Neurological disorder     Janaes Jumana     PAST SURGICAL HISTORY:  Past Surgical History:   Procedure Laterality Date    COLONOSCOPY N/A 7/19/2017    COLONOSCOPY performed by Rona Hassan MD at [facility] ENDOSCOPY    HX CHOLECYSTECTOMY      HX GI      colon resection for colon CA    HX GYN      hysterectomy     MEDICATIONS:  Current Outpatient Medications   Medication Sig Dispense Refill    losartan (COZAAR) 100 mg tablet Take 100 mg by mouth daily. rosuvastatin (CRESTOR) 10 mg tablet Take 10 mg by mouth nightly. desvenlafaxine succinate (PRISTIQ) 50 mg ER tablet Take 50 mg by mouth.        guaiFENesin (Mucinex) 1,200 mg Ta12 ER tablet Take 1 Tablet by mouth two (2) times a day.  (Patient not taking: Reported on 2/2/2023) 12 Tablet 0    dexAMETHasone (Decadron) 6 mg tablet 1 every day for 7 days (Patient not taking: Reported on 2/2/2023) 7 Tablet 0    trimethoprim-sulfamethoxazole (BACTRIM DS, SEPTRA DS) 160-800 mg per tablet Take 1 Tablet by mouth every twelve (12) hours. (Patient not taking: Reported on 2/2/2023) 3 Tablet 0    aspirin delayed-release 81 mg tablet Take 81 mg by mouth daily. Indications: treatment to prevent a heart attack (Patient not taking: Reported on 2/2/2023)      butalbital-acetaminophen-caff (FIORICET) -40 mg per capsule Take 1 Cap by mouth every four (4) hours as needed for Pain. (Patient not taking: No sig reported) 20 Cap 0     ALLERGIES:  Allergies   Allergen Reactions    Contrast Dye [Iodine] Rash    Morphine Shortness of Breath     SOCIAL HISTORY:   Social History     Tobacco Use    Smoking status: Never    Smokeless tobacco: Never   Vaping Use    Vaping Use: Never used   Substance Use Topics    Alcohol use: No    Drug use: Never       FAMILY HISTORY:   Family History   Problem Relation Age of Onset    Hypertension Mother     Diabetes Mother     Heart Disease Father     Hypertension Father     Cancer Father      REVIEW OF SYSTEMS:  Review of Systems - Negative except for documented in the HPI. PHYSICAL EXAMINATION:  Visit Vitals  BP (!) 150/84 (BP 1 Location: Right upper arm, BP Patient Position: Sitting, BP Cuff Size: Large adult)   Pulse 72   Temp 97.3 °F (36.3 °C) (Temporal)   Resp 16   Ht 5' 2\" (1.575 m)   Wt 153 lb 9.6 oz (69.7 kg)   SpO2 98%   BMI 28.09 kg/m²     General appearance: {appearance:864475::\"alert, well appearing, and in no distress\"}. Chest: {chest:604815::\"clear to auscultation, no wheezes, rales or rhonchi, symmetric air entry\"}. CVS exam: {heart exam:354124::\"normal rate, regular rhythm, normal S1, S2, no murmurs, rubs, clicks or gallops\"}. Abdominal exam: {abd exam:513345}.   Exam of extremities: {extremities:606993::\"peripheral pulses normal, no pedal edema, no clubbing or cyanosis\"}  Skin exam - {skin exam:511832::\"normal coloration and turgor, no rashes, no suspicious skin lesions noted\"}. Neurological exam reveals {neuro:886601}.     LABORATORY DATA:  Orders/Pending    Levi Taveras MD  [facility]  02/02/23

## 2023-02-02 NOTE — PROGRESS NOTES
Identified pt with two pt identifiers(name and ). Chief Complaint   Patient presents with    hospitals Care    Elevated Blood Pressure    Dizziness     Has had for about 3 years         Health Maintenance Due   Topic    Depression Screen     COVID-19 Vaccine (1)    Lipid Screen     DTaP/Tdap/Td series (1 - Tdap)    Shingles Vaccine (1 of 2)    Bone Densitometry (Dexa) Screening     Pneumococcal 65+ years (1 - PCV)    Medicare Yearly Exam     Flu Vaccine (1)       Wt Readings from Last 3 Encounters:   23 153 lb 9.6 oz (69.7 kg)   22 140 lb (63.5 kg)   21 149 lb 8 oz (67.8 kg)     Temp Readings from Last 3 Encounters:   23 97.3 °F (36.3 °C) (Temporal)   22 98.7 °F (37.1 °C)   21 98.1 °F (36.7 °C)     BP Readings from Last 3 Encounters:   23 (!) 150/84   22 (!) 179/90   21 132/72     Pulse Readings from Last 3 Encounters:   23 72   22 75   21 66         Learning Assessment:  :     No flowsheet data found. Depression Screening:  :     3 most recent PHQ Screens 2023   Little interest or pleasure in doing things Not at all   Feeling down, depressed, irritable, or hopeless Not at all   Total Score PHQ 2 0       Fall Risk Assessment:  :     Fall Risk Assessment, last 12 mths 2023   Able to walk? Yes   Fall in past 12 months? 0   Do you feel unsteady? 1   Are you worried about falling 1   Is the gait abnormal? 1   Number of falls in past 12 months 0   Fall with injury? 0       Abuse Screening:  :     Abuse Screening Questionnaire 2023   Do you ever feel afraid of your partner? N   Are you in a relationship with someone who physically or mentally threatens you? N   Is it safe for you to go home?  Y       Coordination of Care Questionnaire:  :     1) Have you been to an emergency room, urgent care clinic since your last visit? no   Hospitalized since your last visit? no             2) Have you seen or consulted any other health care providers outside of [address] [place] [address] since your last visit? yes  Dr Dim[facility] (Include any pap smears or colon screenings in this section.)    3) Do you have an Advance Directive on file? yes  Are you interested in receiving information about Advance Directives? no    Patient is accompanied by self I have received verbal consent from Dolph Failing to discuss any/all medical information while they are present in the room. 4.  For patients aged 39-70: Has the patient had a colonoscopy / FIT/ Cologuard? NA - based on age      If the patient is female:    11. For patients aged 41-77: Has the patient had a mammogram within the past 2 years? NA - based on age or sex      10. For patients aged 21-65: Has the patient had a pap smear?  NA - based on age or sex

## 2023-02-03 ENCOUNTER — TELEPHONE (OUTPATIENT)
Dept: FAMILY MEDICINE CLINIC | Age: 83
End: 2023-02-03

## 2023-02-03 LAB
25(OH)D3 SERPL-MCNC: 40.1 NG/ML (ref 30–100)
ALBUMIN SERPL-MCNC: 4 G/DL (ref 3.5–5)
ALBUMIN/GLOB SERPL: 1.3 (ref 1.1–2.2)
ALP SERPL-CCNC: 90 U/L (ref 45–117)
ALT SERPL-CCNC: 30 U/L (ref 12–78)
ANION GAP SERPL CALC-SCNC: 3 MMOL/L (ref 5–15)
AST SERPL-CCNC: 21 U/L (ref 15–37)
BASOPHILS # BLD: 0 K/UL (ref 0–0.1)
BASOPHILS NFR BLD: 1 % (ref 0–1)
BILIRUB SERPL-MCNC: 0.5 MG/DL (ref 0.2–1)
BUN SERPL-MCNC: 15 MG/DL (ref 6–20)
BUN/CREAT SERPL: 18 (ref 12–20)
CALCIUM SERPL-MCNC: 9.3 MG/DL (ref 8.5–10.1)
CHLORIDE SERPL-SCNC: 109 MMOL/L (ref 97–108)
CHOLEST SERPL-MCNC: 182 MG/DL
CO2 SERPL-SCNC: 29 MMOL/L (ref 21–32)
CREAT SERPL-MCNC: 0.85 MG/DL (ref 0.55–1.02)
DIFFERENTIAL METHOD BLD: NORMAL
EOSINOPHIL # BLD: 0.1 K/UL (ref 0–0.4)
EOSINOPHIL NFR BLD: 3 % (ref 0–7)
ERYTHROCYTE [DISTWIDTH] IN BLOOD BY AUTOMATED COUNT: 12.7 % (ref 11.5–14.5)
GLOBULIN SER CALC-MCNC: 3.1 G/DL (ref 2–4)
GLUCOSE SERPL-MCNC: 106 MG/DL (ref 65–100)
HCT VFR BLD AUTO: 41.7 % (ref 35–47)
HDLC SERPL-MCNC: 47 MG/DL
HDLC SERPL: 3.9 (ref 0–5)
HGB BLD-MCNC: 13.8 G/DL (ref 11.5–16)
IMM GRANULOCYTES # BLD AUTO: 0 K/UL (ref 0–0.04)
IMM GRANULOCYTES NFR BLD AUTO: 0 % (ref 0–0.5)
LDLC SERPL CALC-MCNC: 111 MG/DL (ref 0–100)
LYMPHOCYTES # BLD: 1.4 K/UL (ref 0.8–3.5)
LYMPHOCYTES NFR BLD: 37 % (ref 12–49)
MCH RBC QN AUTO: 30.4 PG (ref 26–34)
MCHC RBC AUTO-ENTMCNC: 33.1 G/DL (ref 30–36.5)
MCV RBC AUTO: 91.9 FL (ref 80–99)
MONOCYTES # BLD: 0.4 K/UL (ref 0–1)
MONOCYTES NFR BLD: 12 % (ref 5–13)
NEUTS SEG # BLD: 1.8 K/UL (ref 1.8–8)
NEUTS SEG NFR BLD: 47 % (ref 32–75)
NRBC # BLD: 0 K/UL (ref 0–0.01)
NRBC BLD-RTO: 0 PER 100 WBC
PLATELET # BLD AUTO: 167 K/UL (ref 150–400)
PMV BLD AUTO: 9.5 FL (ref 8.9–12.9)
POTASSIUM SERPL-SCNC: 4.3 MMOL/L (ref 3.5–5.1)
PROT SERPL-MCNC: 7.1 G/DL (ref 6.4–8.2)
RBC # BLD AUTO: 4.54 M/UL (ref 3.8–5.2)
SODIUM SERPL-SCNC: 141 MMOL/L (ref 136–145)
T4 SERPL-MCNC: 9 UG/DL (ref 4.8–13.9)
TRIGL SERPL-MCNC: 120 MG/DL (ref ?–150)
TSH SERPL DL<=0.05 MIU/L-ACNC: 1.6 UIU/ML (ref 0.36–3.74)
VLDLC SERPL CALC-MCNC: 24 MG/DL
WBC # BLD AUTO: 3.7 K/UL (ref 3.6–11)

## 2023-02-03 NOTE — PROGRESS NOTES
Please let patient know that her labs were generally stable and showed no worrisome findings at this time. She is doing well and we will continue to discuss the other concerns and set her up for a thyroid biopsy at her next visit with me. Thanks!

## 2023-02-03 NOTE — TELEPHONE ENCOUNTER
----- Message from Tiff Breen MD sent at 2/3/2023 10:34 AM EST -----  Please let patient know that her labs were generally stable and showed no worrisome findings at this time. She is doing well and we will continue to discuss the other concerns and set her up for a thyroid biopsy at her next visit with me. Thanks!

## 2023-02-06 PROBLEM — Z91.81 AT MODERATE RISK FOR FALL: Status: ACTIVE | Noted: 2023-02-06

## 2023-02-06 NOTE — PATIENT INSTRUCTIONS
Preventing Falls: Care Instructions  Your Care Instructions    Getting around your home safely can be a challenge if you have injuries or health problems that make it easy for you to fall. Loose rugs and furniture in walkways are among the dangers for many older people who have problems walking or who have poor eyesight. People who have conditions such as arthritis, osteoporosis, or dementia also have to be careful not to fall. You can make your home safer with a few simple measures. Follow-up care is a key part of your treatment and safety. Be sure to make and go to all appointments, and call your doctor if you are having problems. It's also a good idea to know your test results and keep a list of the medicines you take. How can you care for yourself at home? Taking care of yourself  You may get dizzy if you do not drink enough water. To prevent dehydration, drink plenty of fluids, enough so that your urine is light yellow or clear like water. Choose water and other caffeine-free clear liquids. If you have kidney, heart, or liver disease and have to limit fluids, talk with your doctor before you increase the amount of fluids you drink. Exercise regularly to improve your strength, muscle tone, and balance. Walk if you can. Swimming may be a good choice if you cannot walk easily. Have your vision and hearing checked each year or any time you notice a change. If you have trouble seeing and hearing, you might not be able to avoid objects and could lose your balance. Know the side effects of the medicines you take. Ask your doctor or pharmacist whether the medicines you take can affect your balance. Sleeping pills or sedatives can affect your balance. Limit the amount of alcohol you drink. Alcohol can impair your balance and other senses. Ask your doctor whether calluses or corns on your feet need to be removed.  If you wear loose-fitting shoes because of calluses or corns, you can lose your balance and fall.  Talk to your doctor if you have numbness in your feet. Preventing falls at home  Remove raised doorway thresholds, throw rugs, and clutter. Repair loose carpet or raised areas in the floor. Move furniture and electrical cords to keep them out of walking paths. Use nonskid floor wax, and wipe up spills right away, especially on ceramic tile floors. If you use a walker or cane, put rubber tips on it. If you use crutches, clean the bottoms of them regularly with an abrasive pad, such as steel wool. Keep your house well lit, especially stairways, porches, and outside walkways. Use night-lights in areas such as hallways and bathrooms. Add extra light switches or use remote switches (such as switches that go on or off when you clap your hands) to make it easier to turn lights on if you have to get up during the night. Install sturdy handrails on stairways. Move items in your cabinets so that the things you use a lot are on the lower shelves (about waist level). Keep a cordless phone and a flashlight with new batteries by your bed. If possible, put a phone in each of the main rooms of your house, or carry a cell phone in case you fall and cannot reach a phone. Or, you can wear a device around your neck or wrist. You push a button that sends a signal for help. Wear low-heeled shoes that fit well and give your feet good support. Use footwear with nonskid soles. Check the heels and soles of your shoes for wear. Repair or replace worn heels or soles. Do not wear socks without shoes on wood floors. Walk on the grass when the sidewalks are slippery. If you live in an area that gets snow and ice in the winter, sprinkle salt on slippery steps and sidewalks. Preventing falls in the bath  Install grab bars and nonskid mats inside and outside your shower or tub and near the toilet and sinks. Use shower chairs and bath benches. Use a hand-held shower head that will allow you to sit while showering.   Get into a tub or shower by putting the weaker leg in first. Get out of a tub or shower with your strong side first.  Repair loose toilet seats and consider installing a raised toilet seat to make getting on and off the toilet easier. Keep your bathroom door unlocked while you are in the shower. Where can you learn more? Go to http://suha-jeanette.info/. Enter [id number] [id number] in the search box to learn more about \"Preventing Falls: Care Instructions. \"  Current as of: March 16, 2018  Content Version: 11.8  © 1467-1765 [employer]. Care instructions adapted under license by [employer] (which disclaims liability or warranty for this information). If you have questions about a medical condition or this instruction, always ask your healthcare professional. [address] any warranty or liability for your use of this information.

## 2023-02-23 ENCOUNTER — OFFICE VISIT (OUTPATIENT)
Dept: FAMILY MEDICINE CLINIC | Age: 83
End: 2023-02-23
Payer: MEDICARE

## 2023-02-23 VITALS
SYSTOLIC BLOOD PRESSURE: 152 MMHG | RESPIRATION RATE: 16 BRPM | OXYGEN SATURATION: 96 % | TEMPERATURE: 98.2 F | DIASTOLIC BLOOD PRESSURE: 78 MMHG | BODY MASS INDEX: 28.63 KG/M2 | WEIGHT: 155.6 LBS | HEART RATE: 78 BPM | HEIGHT: 62 IN

## 2023-02-23 DIAGNOSIS — E04.1 THYROID NODULE: ICD-10-CM

## 2023-02-23 DIAGNOSIS — I10 PRIMARY HYPERTENSION: Primary | ICD-10-CM

## 2023-02-23 DIAGNOSIS — H02.33: ICD-10-CM

## 2023-02-23 DIAGNOSIS — H81.13 BENIGN PAROXYSMAL POSITIONAL VERTIGO DUE TO BILATERAL VESTIBULAR DISORDER: ICD-10-CM

## 2023-02-23 PROCEDURE — 3074F SYST BP LT 130 MM HG: CPT | Performed by: INTERNAL MEDICINE

## 2023-02-23 PROCEDURE — G8536 NO DOC ELDER MAL SCRN: HCPCS | Performed by: INTERNAL MEDICINE

## 2023-02-23 PROCEDURE — G0463 HOSPITAL OUTPT CLINIC VISIT: HCPCS | Performed by: INTERNAL MEDICINE

## 2023-02-23 PROCEDURE — G8417 CALC BMI ABV UP PARAM F/U: HCPCS | Performed by: INTERNAL MEDICINE

## 2023-02-23 PROCEDURE — 1101F PT FALLS ASSESS-DOCD LE1/YR: CPT | Performed by: INTERNAL MEDICINE

## 2023-02-23 PROCEDURE — 99213 OFFICE O/P EST LOW 20 MIN: CPT | Performed by: INTERNAL MEDICINE

## 2023-02-23 PROCEDURE — 1123F ACP DISCUSS/DSCN MKR DOCD: CPT | Performed by: INTERNAL MEDICINE

## 2023-02-23 PROCEDURE — G8400 PT W/DXA NO RESULTS DOC: HCPCS | Performed by: INTERNAL MEDICINE

## 2023-02-23 PROCEDURE — G8510 SCR DEP NEG, NO PLAN REQD: HCPCS | Performed by: INTERNAL MEDICINE

## 2023-02-23 PROCEDURE — G8427 DOCREV CUR MEDS BY ELIG CLIN: HCPCS | Performed by: INTERNAL MEDICINE

## 2023-02-23 PROCEDURE — 3078F DIAST BP <80 MM HG: CPT | Performed by: INTERNAL MEDICINE

## 2023-02-23 PROCEDURE — 1090F PRES/ABSN URINE INCON ASSESS: CPT | Performed by: INTERNAL MEDICINE

## 2023-02-23 RX ORDER — AMLODIPINE BESYLATE 5 MG/1
5 TABLET ORAL DAILY
Qty: 30 TABLET | Refills: 5 | Status: SHIPPED | OUTPATIENT
Start: 2023-02-23 | End: 2023-03-25

## 2023-02-23 NOTE — PROGRESS NOTES
Identified pt with two pt identifiers(name and ). Chief Complaint   Patient presents with    Follow-up     On spot on hand that was taken off         Health Maintenance Due   Topic    DTaP/Tdap/Td series (1 - Tdap)    Shingles Vaccine (1 of 2)    Bone Densitometry (Dexa) Screening     Pneumococcal 65+ years (1 - PCV)    Medicare Yearly Exam     COVID-19 Vaccine (3 - Booster for Pfizer series)    Flu Vaccine (1)       Wt Readings from Last 3 Encounters:   23 155 lb 9.6 oz (70.6 kg)   23 153 lb 9.6 oz (69.7 kg)   22 140 lb (63.5 kg)     Temp Readings from Last 3 Encounters:   23 98.2 °F (36.8 °C) (Temporal)   23 97.3 °F (36.3 °C) (Temporal)   22 98.7 °F (37.1 °C)     BP Readings from Last 3 Encounters:   23 (!) 152/78   23 (!) 150/84   22 (!) 179/90     Pulse Readings from Last 3 Encounters:   23 78   23 72   22 75         Learning Assessment:  :     No flowsheet data found. Depression Screening:  :     3 most recent PHQ Screens 2023   Little interest or pleasure in doing things Not at all   Feeling down, depressed, irritable, or hopeless Not at all   Total Score PHQ 2 0       Fall Risk Assessment:  :     Fall Risk Assessment, last 12 mths 2023   Able to walk? Yes   Fall in past 12 months? 0   Do you feel unsteady? 1   Are you worried about falling 1   Is the gait abnormal? 1   Number of falls in past 12 months 0   Fall with injury? 0       Abuse Screening:  :     Abuse Screening Questionnaire 2023   Do you ever feel afraid of your partner? N N   Are you in a relationship with someone who physically or mentally threatens you? N N   Is it safe for you to go home?  Y Y       Coordination of Care Questionnaire:  :     1) Have you been to an emergency room, urgent care clinic since your last visit? no   Hospitalized since your last visit? no             2) Have you seen or consulted any other health care providers outside of [address] [place] [address] since your last visit? yes  Dermatologist (Include any pap smears or colon screenings in this section.)    3) Do you have an Advance Directive on file? yes  Are you interested in receiving information about Advance Directives? no    Patient is accompanied by self I have received verbal consent from Sebastián Salinas to discuss any/all medical information while they are present in the room. 4.  For patients aged 39-70: Has the patient had a colonoscopy / FIT/ Cologuard? NA - based on age      If the patient is female:    11. For patients aged 41-77: Has the patient had a mammogram within the past 2 years? NA - based on age or sex      10. For patients aged 21-65: Has the patient had a pap smear?  NA - based on age or sex

## 2023-02-23 NOTE — PROGRESS NOTES
CC:  Chief Complaint   Patient presents with    Follow-up     On spot on hand that was taken off        Assessment/Plan:     Diagnoses and all orders for this visit:    1. Primary hypertension  -     amLODIPine (NORVASC) 5 mg tablet; Take 1 Tablet by mouth daily for 30 days. 2. Benign paroxysmal positional vertigo due to bilateral vestibular disorder  -     REFERRAL TO ENT-OTOLARYNGOLOGY    3. Thyroid nodule  -     REFERRAL TO ENT-OTOLARYNGOLOGY  -     US GUIDE ASP THYROID CYST; Future    4. Pseudoptosis of right eye  -     REFERRAL TO OPHTHALMOLOGY    I have discussed the diagnosis with the patient and the intended treatment plan as seen in the above orders. The patient has received an after-visit summary and questions were answered concerning future plans. Asked to return should symptoms worsen or not improve with treatment. Any pending labs and studies will be relayed to patient when they become available. Pt verbalizes understanding of plan of care and denies further questions or concerns at this time. Follow-up and Dispositions    Return in about 3 weeks (around 3/16/2023), or if symptoms worsen or fail to improve, for Check BP. Subjective:     Lazaro Amador is a 80 y.o. female who presents for follow up of hypertension. 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 She is returning for [facility] surgery in the next few weeks. She is fairly new to me and we had started the process of getting her records and her history reveals that she needs to have a biopsy of a thyroid nodule found on US about 1 year ago. It was calcified. Previous biopsies have been normal, but this one was never investigated. Also, she continue to have dizziness, BPPV and would like to see ENT for further evaluation. There is a concern about possible Meinier's disease as well. Lastly, she needs a referral to a new ophthalmologist for chronic ptosis of the right eye.  She had previous surgery, but not enough of the right lid was lifted. Now she is almost always squinting and trying to life the eyelid on herself. Diet and Lifestyle: generally follows a low fat low cholesterol diet, generally follows a low sodium diet, exercises sporadically, sedentary, nonsmoker  Home BP Monitoring: is not measured at home    Cardiovascular ROS: taking medications as instructed, no medication side effects noted, no TIA's, no chest pain on exertion, no dyspnea on exertion, no swelling of ankles. New concerns: as above and BP is not well controlled. We will add amlodipine to her regimen. HISTORICAL  PMH, PSH, FHX, SOCHX, ALLERGIES and MEDICATIONS were reviewed and updated today. Review of Systems, additional:  Pertinent items are noted in HPI. Objective:     Visit Vitals  BP (!) 152/78 (BP 1 Location: Right upper arm, BP Patient Position: Sitting, BP Cuff Size: Large adult)   Pulse 78   Temp 98.2 °F (36.8 °C) (Temporal)   Resp 16   Ht 5' 2\" (1.575 m)   Wt 155 lb 9.6 oz (70.6 kg)   SpO2 96%   BMI 28.46 kg/m²     Appearance: alert, well appearing, and in no distress and normal appearing weight. General exam: CVS exam BP noted to be moderately elevated today in office, S1, S2 normal, no gallop, no murmur, chest clear, no JVD, no HSM, no edema, peripheral vascular exam both carotids normal upstroke without bruits, neurological exam alert, oriented, normal speech, no focal findings or movement disorder noted. Lab review: orders written for new lab studies as appropriate; see orders.      David Kirk MD  [facility]  02/23/23

## 2023-02-28 ENCOUNTER — TELEPHONE (OUTPATIENT)
Dept: FAMILY MEDICINE CLINIC | Age: 83
End: 2023-02-28

## 2023-02-28 DIAGNOSIS — E04.1 THYROID NODULE: Primary | ICD-10-CM

## 2023-02-28 NOTE — TELEPHONE ENCOUNTER
US needs to be just a regular Thyroid US because they need to see how big the nodule is first    [phone] Skyrizi Counseling: I discussed with the patient the risks of risankizumab-rzaa including but not limited to immunosuppression, and serious infections.  The patient understands that monitoring is required including a PPD at baseline and must alert us or the primary physician if symptoms of infection or other concerning signs are noted.

## 2023-03-15 ENCOUNTER — OFFICE VISIT (OUTPATIENT)
Dept: FAMILY MEDICINE CLINIC | Age: 83
End: 2023-03-15
Payer: MEDICARE

## 2023-03-15 VITALS
WEIGHT: 157 LBS | RESPIRATION RATE: 16 BRPM | DIASTOLIC BLOOD PRESSURE: 64 MMHG | SYSTOLIC BLOOD PRESSURE: 120 MMHG | HEIGHT: 62 IN | TEMPERATURE: 97.3 F | HEART RATE: 78 BPM | OXYGEN SATURATION: 97 % | BODY MASS INDEX: 28.89 KG/M2

## 2023-03-15 DIAGNOSIS — I10 PRIMARY HYPERTENSION: Primary | ICD-10-CM

## 2023-03-15 PROCEDURE — G8400 PT W/DXA NO RESULTS DOC: HCPCS | Performed by: INTERNAL MEDICINE

## 2023-03-15 PROCEDURE — 3078F DIAST BP <80 MM HG: CPT | Performed by: INTERNAL MEDICINE

## 2023-03-15 PROCEDURE — 99213 OFFICE O/P EST LOW 20 MIN: CPT | Performed by: INTERNAL MEDICINE

## 2023-03-15 PROCEDURE — 3074F SYST BP LT 130 MM HG: CPT | Performed by: INTERNAL MEDICINE

## 2023-03-15 PROCEDURE — 1123F ACP DISCUSS/DSCN MKR DOCD: CPT | Performed by: INTERNAL MEDICINE

## 2023-03-15 PROCEDURE — 1090F PRES/ABSN URINE INCON ASSESS: CPT | Performed by: INTERNAL MEDICINE

## 2023-03-15 PROCEDURE — G8417 CALC BMI ABV UP PARAM F/U: HCPCS | Performed by: INTERNAL MEDICINE

## 2023-03-15 PROCEDURE — 1101F PT FALLS ASSESS-DOCD LE1/YR: CPT | Performed by: INTERNAL MEDICINE

## 2023-03-15 PROCEDURE — G8536 NO DOC ELDER MAL SCRN: HCPCS | Performed by: INTERNAL MEDICINE

## 2023-03-15 PROCEDURE — G8427 DOCREV CUR MEDS BY ELIG CLIN: HCPCS | Performed by: INTERNAL MEDICINE

## 2023-03-15 PROCEDURE — G8510 SCR DEP NEG, NO PLAN REQD: HCPCS | Performed by: INTERNAL MEDICINE

## 2023-03-15 PROCEDURE — G0463 HOSPITAL OUTPT CLINIC VISIT: HCPCS | Performed by: INTERNAL MEDICINE

## 2023-03-15 RX ORDER — FLUTICASONE PROPIONATE 50 MCG
SPRAY, SUSPENSION (ML) NASAL
COMMUNITY
Start: 2023-01-21

## 2023-03-15 NOTE — PROGRESS NOTES
Identified pt with two pt identifiers(name and ). Chief Complaint   Patient presents with    Follow-up     3 week     Blood Pressure Check        Health Maintenance Due   Topic    DTaP/Tdap/Td series (1 - Tdap)    Shingles Vaccine (1 of 2)    Bone Densitometry (Dexa) Screening     Pneumococcal 65+ years (1 - PCV)    Medicare Yearly Exam     COVID-19 Vaccine (3 - Booster for Pfizer series)    Flu Vaccine (1)       Wt Readings from Last 3 Encounters:   03/15/23 157 lb (71.2 kg)   23 155 lb 9.6 oz (70.6 kg)   23 153 lb 9.6 oz (69.7 kg)     Temp Readings from Last 3 Encounters:   03/15/23 97.3 °F (36.3 °C) (Temporal)   23 98.2 °F (36.8 °C) (Temporal)   23 97.3 °F (36.3 °C) (Temporal)     BP Readings from Last 3 Encounters:   03/15/23 120/64   23 (!) 152/78   23 (!) 150/84     Pulse Readings from Last 3 Encounters:   03/15/23 78   23 78   23 72         Learning Assessment:  :     No flowsheet data found. Depression Screening:  :     3 most recent PHQ Screens 3/15/2023   Little interest or pleasure in doing things Not at all   Feeling down, depressed, irritable, or hopeless Not at all   Total Score PHQ 2 0       Fall Risk Assessment:  :     Fall Risk Assessment, last 12 mths 2023   Able to walk? Yes   Fall in past 12 months? 0   Do you feel unsteady? 1   Are you worried about falling 1   Is the gait abnormal? 1   Number of falls in past 12 months 0   Fall with injury? 0       Abuse Screening:  :     Abuse Screening Questionnaire 3/15/2023 2023 2023   Do you ever feel afraid of your partner? N N N   Are you in a relationship with someone who physically or mentally threatens you? N N N   Is it safe for you to go home?  Y Y Y       Coordination of Care Questionnaire:  :     1) Have you been to an emergency room, urgent care clinic since your last visit? no   Hospitalized since your last visit? no             2) Have you seen or consulted any other health care providers outside of [address] [place] [address] since your last visit? no  (Include any pap smears or colon screenings in this section.)    3) Do you have an Advance Directive on file? yes  Are you interested in receiving information about Advance Directives? no    Patient is accompanied by self I have received verbal consent from Glenis Clark to discuss any/all medical information while they are present in the room. 4.  For patients aged 39-70: Has the patient had a colonoscopy / FIT/ Cologuard? NA - based on age      If the patient is female:    11. For patients aged 41-77: Has the patient had a mammogram within the past 2 years? NA - based on age or sex      10. For patients aged 21-65: Has the patient had a pap smear?  NA - based on age or sex

## 2023-03-16 ENCOUNTER — HOSPITAL ENCOUNTER (OUTPATIENT)
Dept: ULTRASOUND IMAGING | Age: 83
End: 2023-03-16
Attending: INTERNAL MEDICINE
Payer: MEDICARE

## 2023-03-16 ENCOUNTER — HOSPITAL ENCOUNTER (OUTPATIENT)
Dept: ULTRASOUND IMAGING | Age: 83
Discharge: HOME | End: 2023-03-16
Attending: INTERNAL MEDICINE
Payer: MEDICARE

## 2023-03-16 DIAGNOSIS — E04.1 THYROID NODULE: ICD-10-CM

## 2023-03-16 PROCEDURE — 77030014115

## 2023-03-16 PROCEDURE — 76942 ECHO GUIDE FOR BIOPSY: CPT

## 2023-03-16 PROCEDURE — 88172 CYTP DX EVAL FNA 1ST EA SITE: CPT

## 2023-03-16 PROCEDURE — 74011000250 HC RX REV CODE- 250: Performed by: PHYSICIAN ASSISTANT

## 2023-03-16 PROCEDURE — 76536 US EXAM OF HEAD AND NECK: CPT

## 2023-03-16 PROCEDURE — 88173 CYTOPATH EVAL FNA REPORT: CPT

## 2023-03-16 PROCEDURE — 10005 FNA BX W/US GDN 1ST LES: CPT

## 2023-03-16 RX ORDER — LIDOCAINE HYDROCHLORIDE 10 MG/ML
10 INJECTION INFILTRATION; PERINEURAL
Status: COMPLETED | OUTPATIENT
Start: 2023-03-16 | End: 2023-03-16

## 2023-03-16 RX ADMIN — LIDOCAINE HYDROCHLORIDE 10 ML: 10 INJECTION, SOLUTION INFILTRATION; PERINEURAL at 10:23

## 2023-03-19 NOTE — PROGRESS NOTES
Chief Complaint   Patient presents with    Follow-up     3 week     Blood Pressure Check       Assessment/ Plan:   Diagnoses and all orders for this visit:    1. Primary hypertension    Improved and well controlled. Will continue with current medications. Follow up after her FNA and biopsy. I have discussed the diagnosis with the patient and the intended treatment plan as seen in the above orders. The patient has received an after-visit summary and questions were answered concerning future plans. Asked to return should symptoms worsen or not improve with treatment. Any pending labs and studies will be relayed to patient when they become available. Pt verbalizes understanding of plan of care and denies further questions or concerns at this time. Follow-up and Dispositions    Return if symptoms worsen or fail to improve. Subjective:   Kalli Esposito is a 80 y.o. female who presents for follow up of hypertension, which is so much better controlled and she is actually feeling better. She did have the lesion removed from her L-wrist and it is healing well. Next is to complete the thyroid US and FNA. She will have this done on Thursday. She is generally doing well and we will follow up after the biopsy. HISTORICAL  PMH, PSH, FHX, SOCHX, ALLERGIES and MES were reviewed and updated today. Review of Systems  Review of Systems   Constitutional: Negative. HENT: Negative. Eyes: Negative. Respiratory: Negative. Cardiovascular: Negative. Gastrointestinal: Negative. Genitourinary: Negative. Musculoskeletal: Negative. Skin: Negative. Neurological: Negative. Endo/Heme/Allergies: Negative. Psychiatric/Behavioral: Negative. All other systems reviewed and are negative.     Objective:     Vitals:    03/15/23 1536   BP: 120/64   Pulse: 78   Resp: 16   Temp: 97.3 °F (36.3 °C)   TempSrc: Temporal   SpO2: 97%   Weight: 157 lb (71.2 kg)   Height: 5' 2\" (1.575 m) Physical Exam  Vitals and nursing note reviewed. Constitutional:       Appearance: Normal appearance. HENT:      Head: Normocephalic and atraumatic. Mouth/Throat:      Mouth: Mucous membranes are moist.   Eyes:      Extraocular Movements: Extraocular movements intact. Conjunctiva/sclera: Conjunctivae normal.      Pupils: Pupils are equal, round, and reactive to light. Cardiovascular:      Rate and Rhythm: Normal rate and regular rhythm. Pulses: Normal pulses. Heart sounds: Normal heart sounds. Pulmonary:      Effort: Pulmonary effort is normal.      Breath sounds: Normal breath sounds. Musculoskeletal:      Cervical back: Normal range of motion and neck supple. Neurological:      General: No focal deficit present. Mental Status: She is alert. Mental status is at baseline. Cranial Nerves: No cranial nerve deficit. Sensory: No sensory deficit. Motor: No weakness. Coordination: Coordination normal.      Gait: Gait normal.      Deep Tendon Reflexes: Reflexes normal.   Psychiatric:         Mood and Affect: Mood normal.         Behavior: Behavior normal.         Thought Content:  Thought content normal.         Judgment: Judgment normal.       Oly Edwards MD  [facility]   03/15/2023

## 2023-03-23 NOTE — PROGRESS NOTES
Please let patient know that the results of her biopsy show that the specimen from the thyroid was BENIGN. That is good news. We will continue to monitor, but she should not worry about the thyroid at this time. No evidence of cancer. Will await AFIRMA confirmation (that is a secondary test that is sent out). But, nothing is noted as problematic. Thanks!

## 2023-03-23 NOTE — TELEPHONE ENCOUNTER
----- Message from Pal Mcdonnell MD sent at 3/23/2023  7:28 AM EDT -----  Please let patient know that the results of her biopsy show that the specimen from the thyroid was BENIGN. That is good news. We will continue to monitor, but she should not worry about the thyroid at this time. No evidence of cancer. Will await AFIRMA confirmation (that is a secondary test that is sent out). But, nothing is noted as problematic. Thanks!

## 2023-04-19 ENCOUNTER — OFFICE VISIT (OUTPATIENT)
Dept: FAMILY MEDICINE CLINIC | Age: 83
End: 2023-04-19
Payer: MEDICARE

## 2023-04-19 VITALS
HEART RATE: 74 BPM | TEMPERATURE: 98.4 F | DIASTOLIC BLOOD PRESSURE: 64 MMHG | OXYGEN SATURATION: 98 % | SYSTOLIC BLOOD PRESSURE: 128 MMHG | WEIGHT: 159 LBS | RESPIRATION RATE: 16 BRPM | HEIGHT: 62 IN | BODY MASS INDEX: 29.26 KG/M2

## 2023-04-19 DIAGNOSIS — M79.672 PAIN OF LEFT HEEL: Primary | ICD-10-CM

## 2023-04-19 PROCEDURE — 1090F PRES/ABSN URINE INCON ASSESS: CPT | Performed by: FAMILY MEDICINE

## 2023-04-19 PROCEDURE — G0463 HOSPITAL OUTPT CLINIC VISIT: HCPCS | Performed by: FAMILY MEDICINE

## 2023-04-19 PROCEDURE — G8427 DOCREV CUR MEDS BY ELIG CLIN: HCPCS | Performed by: FAMILY MEDICINE

## 2023-04-19 PROCEDURE — G8510 SCR DEP NEG, NO PLAN REQD: HCPCS | Performed by: FAMILY MEDICINE

## 2023-04-19 PROCEDURE — G8417 CALC BMI ABV UP PARAM F/U: HCPCS | Performed by: FAMILY MEDICINE

## 2023-04-19 PROCEDURE — 3074F SYST BP LT 130 MM HG: CPT | Performed by: FAMILY MEDICINE

## 2023-04-19 PROCEDURE — G8400 PT W/DXA NO RESULTS DOC: HCPCS | Performed by: FAMILY MEDICINE

## 2023-04-19 PROCEDURE — 3078F DIAST BP <80 MM HG: CPT | Performed by: FAMILY MEDICINE

## 2023-04-19 PROCEDURE — 1123F ACP DISCUSS/DSCN MKR DOCD: CPT | Performed by: FAMILY MEDICINE

## 2023-04-19 PROCEDURE — 99213 OFFICE O/P EST LOW 20 MIN: CPT | Performed by: FAMILY MEDICINE

## 2023-04-19 PROCEDURE — G8536 NO DOC ELDER MAL SCRN: HCPCS | Performed by: FAMILY MEDICINE

## 2023-04-19 PROCEDURE — 1101F PT FALLS ASSESS-DOCD LE1/YR: CPT | Performed by: FAMILY MEDICINE

## 2023-04-19 RX ORDER — NAPROXEN 500 MG/1
500 TABLET ORAL 2 TIMES DAILY WITH MEALS
Qty: 20 TABLET | Refills: 0 | Status: SHIPPED | OUTPATIENT
Start: 2023-04-19

## 2023-04-19 RX ORDER — AMLODIPINE BESYLATE 5 MG/1
5 TABLET ORAL DAILY
COMMUNITY

## 2023-04-19 NOTE — PROGRESS NOTES
Identified pt with two pt identifiers(name and ). Chief Complaint   Patient presents with    Foot Pain     Left heel pain started 2 weeks ago  Goes into knee        Health Maintenance Due   Topic    DTaP/Tdap/Td series (1 - Tdap)    Shingles Vaccine (1 of 2)    Bone Densitometry (Dexa) Screening     Pneumococcal 65+ years (1 - PCV)    Medicare Yearly Exam     COVID-19 Vaccine (3 - Booster for Pfizer series)       Wt Readings from Last 3 Encounters:   23 159 lb (72.1 kg)   03/15/23 157 lb (71.2 kg)   23 155 lb 9.6 oz (70.6 kg)     Temp Readings from Last 3 Encounters:   23 98.4 °F (36.9 °C) (Temporal)   03/15/23 97.3 °F (36.3 °C) (Temporal)   23 98.2 °F (36.8 °C) (Temporal)     BP Readings from Last 3 Encounters:   23 128/64   03/15/23 120/64   23 (!) 152/78     Pulse Readings from Last 3 Encounters:   23 74   03/15/23 78   23 78         Learning Assessment:  :     No flowsheet data found. Depression Screening:  :     3 most recent PHQ Screens 2023   Little interest or pleasure in doing things Not at all   Feeling down, depressed, irritable, or hopeless Not at all   Total Score PHQ 2 0       Fall Risk Assessment:  :     Fall Risk Assessment, last 12 mths 2023   Able to walk? Yes   Fall in past 12 months? 0   Do you feel unsteady? 1   Are you worried about falling 1   Is the gait abnormal? 1   Number of falls in past 12 months 0   Fall with injury? 0       Abuse Screening:  :     Abuse Screening Questionnaire 2023 3/15/2023 2023 2023   Do you ever feel afraid of your partner? N N N N   Are you in a relationship with someone who physically or mentally threatens you? N N N N   Is it safe for you to go home?  Y Y Y Y       Coordination of Care Questionnaire:  :     1) Have you been to an emergency room, urgent care clinic since your last visit? no   Hospitalized since your last visit? no             2) Have you seen or consulted any other health care providers outside of [address] [place] [address] since your last visit? no  (Include any pap smears or colon screenings in this section.)    3) Do you have an Advance Directive on file? yes  Are you interested in receiving information about Advance Directives? no    Patient is accompanied by self I have received verbal consent from Rubén Letters to discuss any/all medical information while they are present in the room. 4.  For patients aged 39-70: Has the patient had a colonoscopy / FIT/ Cologuard? NA - based on age      If the patient is female:    11. For patients aged 41-77: Has the patient had a mammogram within the past 2 years? NA - based on age or sex      10. For patients aged 21-65: Has the patient had a pap smear?  NA - based on age or sex

## 2023-04-20 NOTE — PROGRESS NOTES
HISTORY OF PRESENT ILLNESS  Diony Jj is a 80 y.o. female. Foot Pain    C/O L heel pain x 2 weeks. No redness or swelling. No hx trauma. Pain to wt bear on heel. Now pain radiates up to L knee. Has taken Tylenol. Current Outpatient Medications on File Prior to Visit   Medication Sig Dispense Refill    amLODIPine (NORVASC) 5 mg tablet Take 1 Tablet by mouth daily. fluticasone propionate (FLONASE) 50 mcg/actuation nasal spray SPRAY SPRAY 1 SPRAY INTO EACH NOSTRIL EVERY DAY FOR 30 DAYS      losartan (COZAAR) 100 mg tablet Take 1 Tablet by mouth daily. rosuvastatin (CRESTOR) 10 mg tablet Take 1 Tablet by mouth nightly. desvenlafaxine succinate (PRISTIQ) 50 mg ER tablet Take 1 Tablet by mouth. No current facility-administered medications on file prior to visit. ROS  Visit Vitals  /64 (BP 1 Location: Left arm, BP Patient Position: Sitting, BP Cuff Size: Adult)   Pulse 74   Temp 98.4 °F (36.9 °C) (Temporal)   Resp 16   Ht 5' 2\" (1.575 m)   Wt 159 lb (72.1 kg)   SpO2 98%   BMI 29.08 kg/m²       Physical Exam  Vitals reviewed. Constitutional:       Appearance: Normal appearance. Musculoskeletal:      Comments: Local tenderness plantar aspect of L heel. No redness. No skin breakdown. No tendon swelling. FROM ankle joint. Neurological:      Mental Status: She is alert. ASSESSMENT and PLAN    ICD-10-CM ICD-9-CM    1. Pain of left heel  M79.672 729.5 naproxen (NAPROSYN) 500 mg tablet      Instructed about plantar fasciitis. Handout of exercises to do at home. Order Naprosyn BID take with food.   FU PRN

## 2023-05-20 RX ORDER — ROSUVASTATIN CALCIUM 10 MG/1
1 TABLET, COATED ORAL NIGHTLY
COMMUNITY

## 2023-05-20 RX ORDER — DESVENLAFAXINE SUCCINATE 50 MG/1
50 TABLET, EXTENDED RELEASE ORAL
COMMUNITY

## 2023-05-20 RX ORDER — FLUTICASONE PROPIONATE 50 MCG
SPRAY, SUSPENSION (ML) NASAL
COMMUNITY
Start: 2023-01-21

## 2023-05-20 RX ORDER — LOSARTAN POTASSIUM 100 MG/1
100 TABLET ORAL DAILY
COMMUNITY

## 2023-05-20 RX ORDER — AMLODIPINE BESYLATE 5 MG/1
5 TABLET ORAL DAILY
COMMUNITY

## 2023-05-20 RX ORDER — NAPROXEN 500 MG/1
500 TABLET ORAL 2 TIMES DAILY WITH MEALS
COMMUNITY
Start: 2023-04-19

## 2023-08-22 DIAGNOSIS — I10 ESSENTIAL (PRIMARY) HYPERTENSION: ICD-10-CM

## 2023-08-22 RX ORDER — AMLODIPINE BESYLATE 5 MG/1
TABLET ORAL
Qty: 90 TABLET | Refills: 1 | Status: SHIPPED | OUTPATIENT
Start: 2023-08-22

## 2023-10-16 ENCOUNTER — OFFICE VISIT (OUTPATIENT)
Age: 83
End: 2023-10-16
Payer: MEDICARE

## 2023-10-16 VITALS
BODY MASS INDEX: 27.97 KG/M2 | SYSTOLIC BLOOD PRESSURE: 120 MMHG | OXYGEN SATURATION: 97 % | WEIGHT: 152 LBS | DIASTOLIC BLOOD PRESSURE: 72 MMHG | HEART RATE: 77 BPM | TEMPERATURE: 98.1 F | HEIGHT: 62 IN | RESPIRATION RATE: 16 BRPM

## 2023-10-16 DIAGNOSIS — R42 DIZZINESS: ICD-10-CM

## 2023-10-16 DIAGNOSIS — F33.1 MODERATE EPISODE OF RECURRENT MAJOR DEPRESSIVE DISORDER (HCC): ICD-10-CM

## 2023-10-16 DIAGNOSIS — M79.605 PAIN OF LEFT LOWER EXTREMITY: Primary | ICD-10-CM

## 2023-10-16 DIAGNOSIS — G47.09 OTHER INSOMNIA: ICD-10-CM

## 2023-10-16 PROCEDURE — G8400 PT W/DXA NO RESULTS DOC: HCPCS | Performed by: FAMILY MEDICINE

## 2023-10-16 PROCEDURE — 99214 OFFICE O/P EST MOD 30 MIN: CPT | Performed by: FAMILY MEDICINE

## 2023-10-16 PROCEDURE — G8484 FLU IMMUNIZE NO ADMIN: HCPCS | Performed by: FAMILY MEDICINE

## 2023-10-16 PROCEDURE — 1036F TOBACCO NON-USER: CPT | Performed by: FAMILY MEDICINE

## 2023-10-16 PROCEDURE — 1123F ACP DISCUSS/DSCN MKR DOCD: CPT | Performed by: FAMILY MEDICINE

## 2023-10-16 PROCEDURE — G8419 CALC BMI OUT NRM PARAM NOF/U: HCPCS | Performed by: FAMILY MEDICINE

## 2023-10-16 PROCEDURE — 3078F DIAST BP <80 MM HG: CPT | Performed by: FAMILY MEDICINE

## 2023-10-16 PROCEDURE — G8427 DOCREV CUR MEDS BY ELIG CLIN: HCPCS | Performed by: FAMILY MEDICINE

## 2023-10-16 PROCEDURE — 1090F PRES/ABSN URINE INCON ASSESS: CPT | Performed by: FAMILY MEDICINE

## 2023-10-16 PROCEDURE — 3074F SYST BP LT 130 MM HG: CPT | Performed by: FAMILY MEDICINE

## 2023-10-16 RX ORDER — MIRTAZAPINE 7.5 MG/1
7.5 TABLET, FILM COATED ORAL NIGHTLY
Qty: 30 TABLET | Refills: 5 | Status: SHIPPED | OUTPATIENT
Start: 2023-10-16

## 2023-10-16 SDOH — ECONOMIC STABILITY: INCOME INSECURITY: HOW HARD IS IT FOR YOU TO PAY FOR THE VERY BASICS LIKE FOOD, HOUSING, MEDICAL CARE, AND HEATING?: NOT HARD AT ALL

## 2023-10-16 SDOH — ECONOMIC STABILITY: HOUSING INSECURITY
IN THE LAST 12 MONTHS, WAS THERE A TIME WHEN YOU DID NOT HAVE A STEADY PLACE TO SLEEP OR SLEPT IN A SHELTER (INCLUDING NOW)?: NO

## 2023-10-16 SDOH — ECONOMIC STABILITY: FOOD INSECURITY: WITHIN THE PAST 12 MONTHS, YOU WORRIED THAT YOUR FOOD WOULD RUN OUT BEFORE YOU GOT MONEY TO BUY MORE.: NEVER TRUE

## 2023-10-16 SDOH — ECONOMIC STABILITY: FOOD INSECURITY: WITHIN THE PAST 12 MONTHS, THE FOOD YOU BOUGHT JUST DIDN'T LAST AND YOU DIDN'T HAVE MONEY TO GET MORE.: NEVER TRUE

## 2023-10-16 ASSESSMENT — PATIENT HEALTH QUESTIONNAIRE - PHQ9
SUM OF ALL RESPONSES TO PHQ QUESTIONS 1-9: 2
SUM OF ALL RESPONSES TO PHQ QUESTIONS 1-9: 2
2. FEELING DOWN, DEPRESSED OR HOPELESS: 1
1. LITTLE INTEREST OR PLEASURE IN DOING THINGS: 1
SUM OF ALL RESPONSES TO PHQ QUESTIONS 1-9: 2
SUM OF ALL RESPONSES TO PHQ QUESTIONS 1-9: 2
SUM OF ALL RESPONSES TO PHQ9 QUESTIONS 1 & 2: 2

## 2023-10-18 ENCOUNTER — HOSPITAL ENCOUNTER (OUTPATIENT)
Facility: HOSPITAL | Age: 83
Discharge: HOME | End: 2023-10-21
Attending: FAMILY MEDICINE
Payer: MEDICARE

## 2023-10-18 DIAGNOSIS — M79.605 PAIN OF LEFT LOWER EXTREMITY: ICD-10-CM

## 2023-10-18 PROCEDURE — 93971 EXTREMITY STUDY: CPT

## 2023-10-19 ENCOUNTER — TELEPHONE (OUTPATIENT)
Age: 83
End: 2023-10-19

## 2023-10-19 NOTE — TELEPHONE ENCOUNTER
----- Message from Shailesh Treadwell MD sent at 10/19/2023  3:13 PM EDT -----  Advise pt her leg doppler showed normal veins. No blood clot.

## 2024-01-02 ENCOUNTER — TELEPHONE (OUTPATIENT)
Age: 84
End: 2024-01-02

## 2024-01-02 NOTE — TELEPHONE ENCOUNTER
Pt called with sinus, headache and stuffed nose. Offered Thursday and Friday with Dr. Palacios. Pt didn't want to wait that long. I suggested [facility], pt said she would go to [facility] today.

## 2024-01-03 ENCOUNTER — OFFICE VISIT (OUTPATIENT)
Age: 84
End: 2024-01-03

## 2024-01-03 VITALS
DIASTOLIC BLOOD PRESSURE: 60 MMHG | OXYGEN SATURATION: 97 % | BODY MASS INDEX: 28.34 KG/M2 | WEIGHT: 154 LBS | HEIGHT: 62 IN | SYSTOLIC BLOOD PRESSURE: 120 MMHG | HEART RATE: 68 BPM | RESPIRATION RATE: 16 BRPM | TEMPERATURE: 98.2 F

## 2024-01-03 DIAGNOSIS — J01.10 ACUTE NON-RECURRENT FRONTAL SINUSITIS: Primary | ICD-10-CM

## 2024-01-03 RX ORDER — AMOXICILLIN AND CLAVULANATE POTASSIUM 875; 125 MG/1; MG/1
1 TABLET, FILM COATED ORAL 2 TIMES DAILY
Qty: 14 TABLET | Refills: 0 | Status: SHIPPED | OUTPATIENT
Start: 2024-01-03 | End: 2024-01-10

## 2024-01-03 NOTE — PATIENT INSTRUCTIONS
Thank you for choosing [facility] !    Warm salt water gargles for sore throat.  Use throat lozenges such as Cepacol or Chloraseptic spray for throat discomfort.  Avoid citrus foods as these may irritate throat discomfort.  Increase fluid intake to maintain hydration  OTC Mucinex if you start to experience cough to loosen secretions.  Daily saline nasal rinses and as needed    Recommend for immune support:  Zinc 100 mg 2 times per day for 10 days.  Vitamin C 500 mg 3 times oer day for 10 days.  Vitamin D2 2000 IU daily for 10 days.  Elderberry daily.  Humidifier. Inhale hot steam from a shower or bath.    Follow up with your PCP if symptoms persist, worsen, you starting running a fever of 100.6 degrees F if not relieved by OTC   OTC Flonase as needed.  Prescription for Augmentin was  sent to your pharmacy.

## 2024-01-03 NOTE — PROGRESS NOTES
Beatrice Lucero (:  1940) is a 83 y.o. female,New patient, here for evaluation of the following chief complaint(s):  Dizziness (Symptom onset x 4 days ) and Facial Pain      ASSESSMENT/PLAN:  Visit Diagnoses and Associated Orders       Acute non-recurrent frontal sinusitis    -  Primary    amoxicillin-clavulanate (AUGMENTIN) 875-125 MG per tablet [[id number]]                Thank you for choosing [facility] !    Warm salt water gargles for sore throat.  Use throat lozenges such as Cepacol or Chloraseptic spray for throat discomfort.  Avoid citrus foods as these may irritate throat discomfort.  Increase fluid intake to maintain hydration  OTC Mucinex if you start to experience cough to loosen secretions.  Daily saline nasal rinses and as needed    Recommend for immune support:  Zinc 100 mg 2 times per day for 10 days.  Vitamin C 500 mg 3 times oer day for 10 days.  Vitamin D2 2000 IU daily for 10 days.  Elderberry daily.  Humidifier. Inhale hot steam from a shower or bath.    Follow up with your PCP if symptoms persist, worsen, you starting running a fever of 100.6 degrees F if not relieved by OTC   OTC Flonase as needed.  Prescription for Augmentin was  sent to your pharmacy.     SUBJECTIVE/OBJECTIVE:  HPI     83 y.o. female presents with symptoms of   sneezing, sinus pressure. Onset of symptoms 7 days ago. Patient reports that she has been using a saline nasal flushes and Tylenol.          Vitals:    24 1154   BP: 120/60   Site: Right Upper Arm   Position: Sitting   Cuff Size: Medium Adult   Pulse: 68   Resp: 16   Temp: 98.2 °F (36.8 °C)   TempSrc: Oral   SpO2: 97%   Weight: 69.9 kg (154 lb)   Height: 1.575 m (5' 2\")       No results found for this visit on 24.     Physical Exam  Constitutional:       Appearance: Normal appearance.   HENT:      Head: Normocephalic and atraumatic.        Comments: +pain upon palpation to right frontal sinuses     Right Ear: Tympanic membrane normal.      Left Ear:

## 2024-01-05 ENCOUNTER — APPOINTMENT (OUTPATIENT)
Facility: HOSPITAL | Age: 84
End: 2024-01-05
Payer: MEDICARE

## 2024-01-05 ENCOUNTER — HOSPITAL ENCOUNTER (EMERGENCY)
Facility: HOSPITAL | Age: 84
Discharge: HOME | End: 2024-01-05
Attending: EMERGENCY MEDICINE
Payer: MEDICARE

## 2024-01-05 VITALS
SYSTOLIC BLOOD PRESSURE: 114 MMHG | DIASTOLIC BLOOD PRESSURE: 55 MMHG | OXYGEN SATURATION: 94 % | HEIGHT: 62 IN | WEIGHT: 155 LBS | HEART RATE: 65 BPM | TEMPERATURE: 97.8 F | RESPIRATION RATE: 18 BRPM | BODY MASS INDEX: 28.52 KG/M2

## 2024-01-05 DIAGNOSIS — H53.2 DIPLOPIA: Primary | ICD-10-CM

## 2024-01-05 LAB
ALBUMIN SERPL-MCNC: 4.2 G/DL (ref 3.5–5)
ALBUMIN/GLOB SERPL: 1.2 (ref 1.1–2.2)
ALP SERPL-CCNC: 105 U/L (ref 45–117)
ALT SERPL-CCNC: 24 U/L (ref 12–78)
ANION GAP SERPL CALC-SCNC: 11 MMOL/L (ref 5–15)
AST SERPL-CCNC: 20 U/L (ref 15–37)
BASOPHILS # BLD: 0 K/UL (ref 0–0.1)
BASOPHILS NFR BLD: 1 % (ref 0–1)
BILIRUB SERPL-MCNC: 0.3 MG/DL (ref 0.2–1)
BUN SERPL-MCNC: 19 MG/DL (ref 6–20)
BUN/CREAT SERPL: 21 (ref 12–20)
CALCIUM SERPL-MCNC: 9.8 MG/DL (ref 8.5–10.1)
CHLORIDE SERPL-SCNC: 103 MMOL/L (ref 97–108)
CO2 SERPL-SCNC: 26 MMOL/L (ref 21–32)
CREAT SERPL-MCNC: 0.89 MG/DL (ref 0.55–1.02)
DIFFERENTIAL METHOD BLD: ABNORMAL
EOSINOPHIL # BLD: 0.2 K/UL (ref 0–0.4)
EOSINOPHIL NFR BLD: 3 % (ref 0–7)
ERYTHROCYTE [DISTWIDTH] IN BLOOD BY AUTOMATED COUNT: 12.2 % (ref 11.5–14.5)
GLOBULIN SER CALC-MCNC: 3.5 G/DL (ref 2–4)
GLUCOSE SERPL-MCNC: 95 MG/DL (ref 65–100)
HCT VFR BLD AUTO: 42.5 % (ref 35–47)
HGB BLD-MCNC: 14.8 G/DL (ref 11.5–16)
IMM GRANULOCYTES # BLD AUTO: 0 K/UL (ref 0–0.04)
IMM GRANULOCYTES NFR BLD AUTO: 0 % (ref 0–0.5)
INR PPP: 1 (ref 0.9–1.1)
LYMPHOCYTES # BLD: 2 K/UL (ref 0.8–3.5)
LYMPHOCYTES NFR BLD: 34 % (ref 12–49)
MCH RBC QN AUTO: 31.3 PG (ref 26–34)
MCHC RBC AUTO-ENTMCNC: 34.8 G/DL (ref 30–36.5)
MCV RBC AUTO: 89.9 FL (ref 80–99)
MONOCYTES # BLD: 0.6 K/UL (ref 0–1)
MONOCYTES NFR BLD: 11 % (ref 5–13)
NEUTS SEG # BLD: 3.1 K/UL (ref 1.8–8)
NEUTS SEG NFR BLD: 51 % (ref 32–75)
NRBC # BLD: 0 K/UL (ref 0–0.01)
NRBC BLD-RTO: 0 PER 100 WBC
PLATELET # BLD AUTO: 146 K/UL (ref 150–400)
PMV BLD AUTO: 9.1 FL (ref 8.9–12.9)
POTASSIUM SERPL-SCNC: 4.2 MMOL/L (ref 3.5–5.1)
PROT SERPL-MCNC: 7.7 G/DL (ref 6.4–8.2)
PROTHROMBIN TIME: 9.8 SEC (ref 9–11.1)
RBC # BLD AUTO: 4.73 M/UL (ref 3.8–5.2)
SODIUM SERPL-SCNC: 140 MMOL/L (ref 136–145)
WBC # BLD AUTO: 5.9 K/UL (ref 3.6–11)

## 2024-01-05 PROCEDURE — A9579 GAD-BASE MR CONTRAST NOS,1ML: HCPCS | Performed by: EMERGENCY MEDICINE

## 2024-01-05 PROCEDURE — 85610 PROTHROMBIN TIME: CPT

## 2024-01-05 PROCEDURE — 6360000002 HC RX W HCPCS: Performed by: EMERGENCY MEDICINE

## 2024-01-05 PROCEDURE — 99285 EMERGENCY DEPT VISIT HI MDM: CPT

## 2024-01-05 PROCEDURE — 80053 COMPREHEN METABOLIC PANEL: CPT

## 2024-01-05 PROCEDURE — 85025 COMPLETE CBC W/AUTO DIFF WBC: CPT

## 2024-01-05 PROCEDURE — 36415 COLL VENOUS BLD VENIPUNCTURE: CPT

## 2024-01-05 PROCEDURE — 6360000004 HC RX CONTRAST MEDICATION: Performed by: EMERGENCY MEDICINE

## 2024-01-05 PROCEDURE — 96374 THER/PROPH/DIAG INJ IV PUSH: CPT

## 2024-01-05 PROCEDURE — 70553 MRI BRAIN STEM W/O & W/DYE: CPT

## 2024-01-05 RX ORDER — LORAZEPAM 2 MG/ML
0.5 INJECTION INTRAMUSCULAR ONCE
Status: COMPLETED | OUTPATIENT
Start: 2024-01-05 | End: 2024-01-05

## 2024-01-05 RX ADMIN — LORAZEPAM 0.5 MG: 2 INJECTION INTRAMUSCULAR; INTRAVENOUS at 17:02

## 2024-01-05 RX ADMIN — GADOTERIDOL 13 ML: 279.3 INJECTION, SOLUTION INTRAVENOUS at 18:01

## 2024-01-05 ASSESSMENT — ENCOUNTER SYMPTOMS
COUGH: 0
VOMITING: 0
SORE THROAT: 0

## 2024-01-05 ASSESSMENT — PAIN DESCRIPTION - DESCRIPTORS: DESCRIPTORS: ACHING

## 2024-01-05 ASSESSMENT — PAIN DESCRIPTION - LOCATION: LOCATION: HEAD

## 2024-01-05 ASSESSMENT — PAIN SCALES - GENERAL: PAINLEVEL_OUTOF10: 2

## 2024-01-05 NOTE — ED NOTES
Patient ambulated to restroom with steady gait with assistance of this nurse. Patient back in room and in bed. Call bell within reach and updated that we will keep her updated on results.

## 2024-01-05 NOTE — ED PROVIDER NOTES
[facility] EMERGENCY DEPT  EMERGENCY DEPARTMENT ENCOUNTER      Pt Name: Beatrice Lucero  MRN: [id number]  Birthdate 1940  Date of evaluation: 1/5/2024  Provider: Frank Goldberg MD    CHIEF COMPLAINT       Chief Complaint   Patient presents with    Diplopia         HISTORY OF PRESENT ILLNESS   (Location/Symptom, Timing/Onset, Context/Setting, Quality, Duration, Modifying Factors, Severity)  Note limiting factors.   Pt presents from ophthalmology with c/o double vision.  Started amoxicillin 2 days ago from urgent care for sinus infection.  Yesterday, developed double vision.  Saw ophthalmology today and was concerned for possible stroke affecting movement of L eye.  Sent here for MRI.  Pt denies any previous stroke.  Takes no blood thinning medication.    The history is provided by the patient, the spouse and medical records.         Review of External Medical Records:     Nursing Notes were reviewed.    REVIEW OF SYSTEMS    (2-9 systems for level 4, 10 or more for level 5)     Review of Systems   Constitutional:  Negative for fatigue.   HENT:  Negative for sore throat.    Eyes:  Positive for visual disturbance.   Respiratory:  Negative for cough.    Cardiovascular:  Negative for palpitations.   Gastrointestinal:  Negative for vomiting.   Genitourinary:  Negative for difficulty urinating.   Musculoskeletal:  Negative for myalgias.   Skin:  Negative for rash.   Neurological:  Negative for weakness.       Except as noted above the remainder of the review of systems was reviewed and negative.       PAST MEDICAL HISTORY     Past Medical History:   Diagnosis Date    Colon cancer (HCC) 2007    Essential hypertension     Family history of skin cancer     Ill-defined condition     shingles right chest    Neurological disorder     Jony Oneil         SURGICAL HISTORY       Past Surgical History:   Procedure Laterality Date    CHOLECYSTECTOMY      COLONOSCOPY N/A 7/19/2017    COLONOSCOPY performed by Ellis Corbett MD at [facility]

## 2024-01-05 NOTE — ED TRIAGE NOTES
Pt assisted to treatment area she states the last Friday she began with sinus pressure and she went to an Urgent care on Wednesday she was given some antibiotics then yesterday morning she woke with double vision.  She thought that it was from her sinuses but today the double vision continued.  She called her PCP who referred her to an opthamologist that sent her here to be evaluated for a possible stroke in her left eye.  She is having a difficult time focusing

## 2024-01-08 ENCOUNTER — TELEPHONE (OUTPATIENT)
Age: 84
End: 2024-01-08

## 2024-01-08 NOTE — TELEPHONE ENCOUNTER
Pt daughter called and said she was seen in ER for a mini stroke and is now seeing double vision but goes to eye doctor tomorrow for it but she would like for her to be seen asap for an ED follow up. No available appointments this week. Can she be fit in anywhere?

## 2024-01-10 ENCOUNTER — TELEPHONE (OUTPATIENT)
Age: 84
End: 2024-01-10

## 2024-01-10 NOTE — TELEPHONE ENCOUNTER
Per last telephone encounter, I double booked patient for 01/11/24 at 1PM.     I requested her 01/09/24 office notes from [facility].

## 2024-01-11 ENCOUNTER — OFFICE VISIT (OUTPATIENT)
Age: 84
End: 2024-01-11
Payer: MEDICARE

## 2024-01-11 VITALS
HEART RATE: 78 BPM | OXYGEN SATURATION: 97 % | TEMPERATURE: 97.9 F | SYSTOLIC BLOOD PRESSURE: 106 MMHG | WEIGHT: 147 LBS | HEIGHT: 62 IN | RESPIRATION RATE: 16 BRPM | DIASTOLIC BLOOD PRESSURE: 54 MMHG | BODY MASS INDEX: 27.05 KG/M2

## 2024-01-11 DIAGNOSIS — H51.8 DYSCONJUGATE GAZE: ICD-10-CM

## 2024-01-11 DIAGNOSIS — H53.2 DIPLOPIA: Primary | ICD-10-CM

## 2024-01-11 DIAGNOSIS — Z86.69 HISTORY OF BELL'S PALSY: ICD-10-CM

## 2024-01-11 DIAGNOSIS — J01.00 ACUTE NON-RECURRENT MAXILLARY SINUSITIS: ICD-10-CM

## 2024-01-11 DIAGNOSIS — F33.1 MODERATE EPISODE OF RECURRENT MAJOR DEPRESSIVE DISORDER (HCC): ICD-10-CM

## 2024-01-11 PROCEDURE — 3078F DIAST BP <80 MM HG: CPT | Performed by: INTERNAL MEDICINE

## 2024-01-11 PROCEDURE — 1123F ACP DISCUSS/DSCN MKR DOCD: CPT | Performed by: INTERNAL MEDICINE

## 2024-01-11 PROCEDURE — G8428 CUR MEDS NOT DOCUMENT: HCPCS | Performed by: INTERNAL MEDICINE

## 2024-01-11 PROCEDURE — 3074F SYST BP LT 130 MM HG: CPT | Performed by: INTERNAL MEDICINE

## 2024-01-11 PROCEDURE — 99214 OFFICE O/P EST MOD 30 MIN: CPT | Performed by: INTERNAL MEDICINE

## 2024-01-11 PROCEDURE — 1036F TOBACCO NON-USER: CPT | Performed by: INTERNAL MEDICINE

## 2024-01-11 PROCEDURE — G8400 PT W/DXA NO RESULTS DOC: HCPCS | Performed by: INTERNAL MEDICINE

## 2024-01-11 PROCEDURE — G8484 FLU IMMUNIZE NO ADMIN: HCPCS | Performed by: INTERNAL MEDICINE

## 2024-01-11 PROCEDURE — 1090F PRES/ABSN URINE INCON ASSESS: CPT | Performed by: INTERNAL MEDICINE

## 2024-01-11 PROCEDURE — G8419 CALC BMI OUT NRM PARAM NOF/U: HCPCS | Performed by: INTERNAL MEDICINE

## 2024-01-11 RX ORDER — PREDNISONE 10 MG/1
TABLET ORAL
Qty: 1 EACH | Refills: 0 | Status: SHIPPED | OUTPATIENT
Start: 2024-01-11

## 2024-01-11 ASSESSMENT — PATIENT HEALTH QUESTIONNAIRE - PHQ9
2. FEELING DOWN, DEPRESSED OR HOPELESS: 0
SUM OF ALL RESPONSES TO PHQ9 QUESTIONS 1 & 2: 0
SUM OF ALL RESPONSES TO PHQ QUESTIONS 1-9: 0
1. LITTLE INTEREST OR PLEASURE IN DOING THINGS: 0
SUM OF ALL RESPONSES TO PHQ QUESTIONS 1-9: 0

## 2024-01-11 ASSESSMENT — ENCOUNTER SYMPTOMS
GASTROINTESTINAL NEGATIVE: 1
EYES NEGATIVE: 1
ALLERGIC/IMMUNOLOGIC NEGATIVE: 1
RESPIRATORY NEGATIVE: 1

## 2024-01-11 NOTE — ASSESSMENT & PLAN NOTE
Asymptomatic, continue current medications and continue current treatment plan  She feels that her depression is just about the same.

## 2024-01-11 NOTE — PROGRESS NOTES
Chief Complaint   Patient presents with    Follow-Up from Hospital     Pt was having double vision and she went to the eye Dr and they were suspicious of a stroke. She went and had a MRI and they saw that she had a mini stroke. She complains today of a headache.    PCP review of the record shows no indication that she had a stroke. MRI was normal.     Assessment/ Plan:   Beatrice was seen today for follow-up from ER for onset of double vision in the setting of sinusitis. She also had some dizziness.    Diagnoses and all orders for this visit:  1. Diplopia   On MRI - no evidence of intraorbital abnormality. No acute intracranial abnormality   Mild chronic microvascular ischemic disease.    Will send back to see Dr. Evans (ophthalmology).   2. Acute non-recurrent maxillary sinusitis   She has almost completed her antibiotics.   3. History of Bell's palsy   She reports that she had this about 2-3 years ago and it did affect the right side of her face.    She does have lateral gaze on the R-eye.    EOMI  4. Dysconjugate gaze   Right eye lags to the right. Unclear if chronic or new. EOMI.    She has been treated by Dr. Hill for macular degeneration of the right eye.    She has a follow up appointment with Dr. Evans on (2/9/2023)   5. Moderate episode of recurrent major depressive disorder (HCC)  Assessment & Plan:  Asymptomatic, continue current medications and continue current treatment plan  She feels that her depression is just about the same.     She also is c/o headache when she lays down. Suspect multifactorial and also a complication of sinus disease. She seems to have a complicated sinusitis vs a stroke.   She will finish her antibiotics and then we are going to add a short course of prednisone to help with the pressure and headache in the setting of a normal MRI for the most part. She does have some ischemic small vessel changes which are probably chronic.     I have discussed the diagnosis with the patient and

## 2024-01-11 NOTE — PROGRESS NOTES
Identified pt with two pt identifiers(name and ).    Chief Complaint   Patient presents with    Follow-Up from Hospital     Pt was having double vision and she went to the eye Dr and they were suspicious of a stroke. She went and had a MRI and they saw that she had a mini stroke. She complains today of a headache.     Dizziness        Health Maintenance Due   Topic    DTaP/Tdap/Td vaccine (1 - Tdap)    Shingles vaccine (1 of 2)    DEXA (modify frequency per FRAX score)     Respiratory Syncytial Virus (RSV) Pregnant or age 60 yrs+ (1 - 1-dose 60+ series)    Pneumococcal 65+ years Vaccine (1 - PCV)    Flu vaccine (1)    COVID-19 Vaccine (3 -  season)    Annual Wellness Visit (Medicare)     Lipids        Wt Readings from Last 3 Encounters:   24 66.7 kg (147 lb)   24 70.3 kg (155 lb)   24 69.9 kg (154 lb)     Temp Readings from Last 3 Encounters:   24 97.9 °F (36.6 °C) (Temporal)   24 97.8 °F (36.6 °C) (Oral)   24 98.2 °F (36.8 °C) (Oral)     BP Readings from Last 3 Encounters:   24 (!) 106/54   24 (!) 114/55   24 120/60     Pulse Readings from Last 3 Encounters:   24 78   24 65   24 68           Depression Screening:  :         2024     1:09 PM 10/16/2023     9:52 AM 2023    10:00 AM 3/15/2023     3:00 PM 2023     2:00 PM 2023     9:00 AM   PHQ-9 Questionaire   Little interest or pleasure in doing things 0 1 0 0 0 0   Feeling down, depressed, or hopeless 0 1 0 0 0 0   PHQ-9 Total Score 0 2 0 0 0 0        Fall Risk Assessment:  :         10/16/2023     9:54 AM   Fall Risk   2 or more falls in past year? yes   Fall with injury in past year? no        Abuse Screening:  :          No data to display                 Coordination of Care Questionnaire:  :     \"Have you been to the ER, urgent care clinic since your last visit?  Hospitalized since your last visit?\"    Yes [facility] for MRI     “Have you seen or consulted

## 2024-02-07 DIAGNOSIS — G47.09 OTHER INSOMNIA: ICD-10-CM

## 2024-02-07 DIAGNOSIS — F33.1 MODERATE EPISODE OF RECURRENT MAJOR DEPRESSIVE DISORDER (HCC): ICD-10-CM

## 2024-02-07 RX ORDER — MIRTAZAPINE 7.5 MG/1
7.5 TABLET, FILM COATED ORAL NIGHTLY
Qty: 90 TABLET | Refills: 1 | Status: SHIPPED | OUTPATIENT
Start: 2024-02-07

## 2024-02-23 DIAGNOSIS — I10 ESSENTIAL (PRIMARY) HYPERTENSION: ICD-10-CM

## 2024-02-23 RX ORDER — AMLODIPINE BESYLATE 5 MG/1
TABLET ORAL
Qty: 90 TABLET | Refills: 1 | Status: SHIPPED | OUTPATIENT
Start: 2024-02-23

## 2024-04-20 NOTE — PROGRESS NOTES
0700 Bedside shift change report given to Rakel RN (oncoming nurse) by Ceci Saenz RN (offgoing nurse). Report included the following information SBAR, Kardex, ED Summary, Intake/Output, MAR and Recent Results. 1900 Bedside shift change report [facility] ([facility] nurse) by Yolanda Neri RN (offgoing nurse). Report included the following information SBAR, Kardex, ED Summary, Intake/Output, MAR and Recent Results. This patient was assisted with Intentional Toileting every 2 hours during this shift as appropriate. Documentation of ambulation and output reflected on Flowsheet as appropriate. Purposeful hourly rounding was completed using AIDET and 5Ps. Outcomes of PHR documented as they occurred. Bed alarm in use as appropriate. Dual Suction and ambubag in place. Assessment:  POA WBC 11.45 however afebrile, was noted tachycardia however this is in the setting of pulmonary embolism.   In the ED was given ceftriaxone and azithromycin given imaging findings concerning for possible multifocal pneumonia.  Leukocytosis likely reactive.    Plan:  Resolved

## 2024-04-26 RX ORDER — LOSARTAN POTASSIUM 100 MG/1
100 TABLET ORAL DAILY
Qty: 30 TABLET | Refills: 1 | Status: SHIPPED | OUTPATIENT
Start: 2024-04-26

## 2024-04-26 NOTE — TELEPHONE ENCOUNTER
Pt refill request for   losartan (COZAAR) 100 MG tablet   She is completley out. [facility]/pharmacy #[id number] - [place], [place] - [facility] [facility] -  [phone] - F [phone]

## 2024-05-02 ENCOUNTER — OFFICE VISIT (OUTPATIENT)
Age: 84
End: 2024-05-02
Payer: MEDICARE

## 2024-05-02 VITALS
BODY MASS INDEX: 27.6 KG/M2 | TEMPERATURE: 97.1 F | RESPIRATION RATE: 16 BRPM | OXYGEN SATURATION: 96 % | DIASTOLIC BLOOD PRESSURE: 72 MMHG | SYSTOLIC BLOOD PRESSURE: 120 MMHG | WEIGHT: 146.2 LBS | HEIGHT: 61 IN | HEART RATE: 78 BPM

## 2024-05-02 DIAGNOSIS — Z12.31 SCREENING MAMMOGRAM FOR BREAST CANCER: ICD-10-CM

## 2024-05-02 DIAGNOSIS — R53.81 MALAISE AND FATIGUE: ICD-10-CM

## 2024-05-02 DIAGNOSIS — E55.9 VITAMIN D DEFICIENCY, UNSPECIFIED: ICD-10-CM

## 2024-05-02 DIAGNOSIS — E78.2 MIXED HYPERLIPIDEMIA: ICD-10-CM

## 2024-05-02 DIAGNOSIS — Z13.820 SCREENING FOR OSTEOPOROSIS: ICD-10-CM

## 2024-05-02 DIAGNOSIS — I10 PRIMARY HYPERTENSION: ICD-10-CM

## 2024-05-02 DIAGNOSIS — Z00.00 MEDICARE ANNUAL WELLNESS VISIT, SUBSEQUENT: Primary | ICD-10-CM

## 2024-05-02 DIAGNOSIS — R53.83 MALAISE AND FATIGUE: ICD-10-CM

## 2024-05-02 DIAGNOSIS — Z78.0 POST-MENOPAUSE: ICD-10-CM

## 2024-05-02 PROCEDURE — 99214 OFFICE O/P EST MOD 30 MIN: CPT | Performed by: INTERNAL MEDICINE

## 2024-05-02 PROCEDURE — 3074F SYST BP LT 130 MM HG: CPT | Performed by: INTERNAL MEDICINE

## 2024-05-02 PROCEDURE — G0439 PPPS, SUBSEQ VISIT: HCPCS | Performed by: INTERNAL MEDICINE

## 2024-05-02 PROCEDURE — G8419 CALC BMI OUT NRM PARAM NOF/U: HCPCS | Performed by: INTERNAL MEDICINE

## 2024-05-02 PROCEDURE — 1036F TOBACCO NON-USER: CPT | Performed by: INTERNAL MEDICINE

## 2024-05-02 PROCEDURE — G8400 PT W/DXA NO RESULTS DOC: HCPCS | Performed by: INTERNAL MEDICINE

## 2024-05-02 PROCEDURE — G8427 DOCREV CUR MEDS BY ELIG CLIN: HCPCS | Performed by: INTERNAL MEDICINE

## 2024-05-02 PROCEDURE — 1123F ACP DISCUSS/DSCN MKR DOCD: CPT | Performed by: INTERNAL MEDICINE

## 2024-05-02 PROCEDURE — 3078F DIAST BP <80 MM HG: CPT | Performed by: INTERNAL MEDICINE

## 2024-05-02 PROCEDURE — 1090F PRES/ABSN URINE INCON ASSESS: CPT | Performed by: INTERNAL MEDICINE

## 2024-05-02 RX ORDER — LOSARTAN POTASSIUM 100 MG/1
100 TABLET ORAL DAILY
Qty: 90 TABLET | Refills: 1 | Status: SHIPPED | OUTPATIENT
Start: 2024-05-02 | End: 2024-10-29

## 2024-05-02 ASSESSMENT — LIFESTYLE VARIABLES
HOW OFTEN DO YOU HAVE A DRINK CONTAINING ALCOHOL: NEVER
HOW MANY STANDARD DRINKS CONTAINING ALCOHOL DO YOU HAVE ON A TYPICAL DAY: PATIENT DOES NOT DRINK

## 2024-05-02 ASSESSMENT — PATIENT HEALTH QUESTIONNAIRE - PHQ9
7. TROUBLE CONCENTRATING ON THINGS, SUCH AS READING THE NEWSPAPER OR WATCHING TELEVISION: NOT AT ALL
2. FEELING DOWN, DEPRESSED OR HOPELESS: NOT AT ALL
SUM OF ALL RESPONSES TO PHQ QUESTIONS 1-9: 0
3. TROUBLE FALLING OR STAYING ASLEEP: NOT AT ALL
4. FEELING TIRED OR HAVING LITTLE ENERGY: NOT AT ALL
10. IF YOU CHECKED OFF ANY PROBLEMS, HOW DIFFICULT HAVE THESE PROBLEMS MADE IT FOR YOU TO DO YOUR WORK, TAKE CARE OF THINGS AT HOME, OR GET ALONG WITH OTHER PEOPLE: NOT DIFFICULT AT ALL
SUM OF ALL RESPONSES TO PHQ QUESTIONS 1-9: 0
SUM OF ALL RESPONSES TO PHQ QUESTIONS 1-9: 0
6. FEELING BAD ABOUT YOURSELF - OR THAT YOU ARE A FAILURE OR HAVE LET YOURSELF OR YOUR FAMILY DOWN: NOT AT ALL
5. POOR APPETITE OR OVEREATING: NOT AT ALL
SUM OF ALL RESPONSES TO PHQ9 QUESTIONS 1 & 2: 0
SUM OF ALL RESPONSES TO PHQ QUESTIONS 1-9: 0
8. MOVING OR SPEAKING SO SLOWLY THAT OTHER PEOPLE COULD HAVE NOTICED. OR THE OPPOSITE, BEING SO FIGETY OR RESTLESS THAT YOU HAVE BEEN MOVING AROUND A LOT MORE THAN USUAL: NOT AT ALL
1. LITTLE INTEREST OR PLEASURE IN DOING THINGS: NOT AT ALL
9. THOUGHTS THAT YOU WOULD BE BETTER OFF DEAD, OR OF HURTING YOURSELF: NOT AT ALL

## 2024-05-02 NOTE — PROGRESS NOTES
Medicare Annual Wellness Visit    Beatrice Lucero is here for Medicare AWV (Patient needing refill of Losartan /Patient seeking advice about urine leakage)    Assessment & Plan   Medicare annual wellness visit, subsequent  Anticipatory guidance discussed.   Immunizations reviewed  HM updated.   Vitamin D deficiency, unspecified  -     Vitamin D 25 Hydroxy; Future  Mixed hyperlipidemia  -     CBC with Auto Differential; Future  -     Comprehensive Metabolic Panel; Future  -     Lipid Panel; Future  Malaise and fatigue  -     T4, Free; Future  -     TSH; Future  Primary hypertension  -     losartan (COZAAR) 100 MG tablet; Take 1 tablet by mouth daily, Disp-90 tablet, R-1Normal  Screening for osteoporosis  -     DEXA BONE DENSITY AXIAL SKELETON; Future  Post-menopause  -     DEXA BONE DENSITY AXIAL SKELETON; Future  Screening mammogram for breast cancer  -     DICK DIGITAL SCREEN W OR WO CAD BILATERAL; Future    I have discussed the diagnosis with the patient and the intended treatment plan as seen in the above orders. The patient has received an after-visit summary and questions were answered concerning future plans. Asked to return should symptoms worsen or not improve with treatment. Any pending labs and studies will be relayed to patient when they become available.     Pt verbalizes understanding of plan of care and denies further questions or concerns at this time.     Follow up:  Return in 1 year (on 5/2/2025), or if symptoms worsen or fail to improve.       Subjective   Very pleasant 83F who is doing very well. She has no major concerns or issues today. She is UTD and needs HM updated as above.   She will also have labs today.     Patient's complete Health Risk Assessment and screening values have been reviewed and are found in Flowsheets. The following problems were reviewed today and where indicated follow up appointments were made and/or referrals ordered.    Positive Risk Factor Screenings with Interventions:

## 2024-05-02 NOTE — PATIENT INSTRUCTIONS
condition or this instruction, always ask your healthcare professional. [employer], [employer] disclaims any warranty or liability for your use of this information.      Personalized Preventive Plan for Beatrice Lucero - 5/2/2024  Medicare offers a range of preventive health benefits. Some of the tests and screenings are paid in full while other may be subject to a deductible, co-insurance, and/or copay.    Some of these benefits include a comprehensive review of your medical history including lifestyle, illnesses that may run in your family, and various assessments and screenings as appropriate.    After reviewing your medical record and screening and assessments performed today your provider may have ordered immunizations, labs, imaging, and/or referrals for you.  A list of these orders (if applicable) as well as your Preventive Care list are included within your After Visit Summary for your review.    Other Preventive Recommendations:    A preventive eye exam performed by an eye specialist is recommended every 1-2 years to screen for glaucoma; cataracts, macular degeneration, and other eye disorders.  A preventive dental visit is recommended every 6 months.  Try to get at least 150 minutes of exercise per week or 10,000 steps per day on a pedometer .  Order or download the FREE \"Exercise & Physical Activity: Your Everyday Guide\" from [employer] [place] [employer]. Call 1-133.555.3801 or search [employer] [place] [employer] online.  You need 9970-5512 mg of calcium and 6569-0234 IU of vitamin D per day. It is possible to meet your calcium requirement with diet alone, but a vitamin D supplement is usually necessary to meet this goal.  When exposed to the sun, use a sunscreen that protects against both UVA and UVB radiation with an SPF of 30 or greater. Reapply every 2 to 3 hours or after sweating, drying off with a towel, or swimming.  Always wear a seat belt when traveling in a car. Always wear a helmet when

## 2024-05-02 NOTE — PROGRESS NOTES
Identified pt with two pt identifiers(name and ).    Chief Complaint   Patient presents with    Medicare AWV     Patient needing refill of Losartan   Patient seeking advice about urine leakage        Health Maintenance Due   Topic    DTaP/Tdap/Td vaccine (1 - Tdap)    Shingles vaccine (1 of 2)    DEXA (modify frequency per FRAX score)     Respiratory Syncytial Virus (RSV) Pregnant or age 60 yrs+ (1 - 1-dose 60+ series)    Pneumococcal 65+ years Vaccine (1 of 1 - PCV)    COVID-19 Vaccine (3 -  season)    Annual Wellness Visit (Medicare)     Lipids        Wt Readings from Last 3 Encounters:   24 66.3 kg (146 lb 3.2 oz)   24 66.7 kg (147 lb)   24 70.3 kg (155 lb)     Temp Readings from Last 3 Encounters:   24 97.1 °F (36.2 °C) (Temporal)   24 97.9 °F (36.6 °C) (Temporal)   24 97.8 °F (36.6 °C) (Oral)     BP Readings from Last 3 Encounters:   24 (!) 144/72   24 (!) 106/54   24 (!) 114/55     Pulse Readings from Last 3 Encounters:   24 78   24 78   24 65           Depression Screening:  :         2024    10:15 AM 2024     1:09 PM 10/16/2023     9:52 AM 2023    10:00 AM 3/15/2023     3:00 PM 2023     2:00 PM 2023     9:00 AM   PHQ-9 Questionaire   Little interest or pleasure in doing things 0 0 1 0 0 0 0   Feeling down, depressed, or hopeless 0 0 1 0 0 0 0   Trouble falling or staying asleep, or sleeping too much 0         Feeling tired or having little energy 0         Poor appetite or overeating 0         Feeling bad about yourself - or that you are a failure or have let yourself or your family down 0         Trouble concentrating on things, such as reading the newspaper or watching television 0         Moving or speaking so slowly that other people could have noticed. Or the opposite - being so fidgety or restless that you have been moving around a lot more than usual 0         Thoughts that you would be better off

## 2024-05-14 ENCOUNTER — HOSPITAL ENCOUNTER (OUTPATIENT)
Facility: HOSPITAL | Age: 84
Discharge: HOME | End: 2024-05-17
Attending: INTERNAL MEDICINE
Payer: MEDICARE

## 2024-05-14 VITALS — HEIGHT: 61 IN | WEIGHT: 146 LBS | BODY MASS INDEX: 27.56 KG/M2

## 2024-05-14 DIAGNOSIS — Z12.31 SCREENING MAMMOGRAM FOR BREAST CANCER: ICD-10-CM

## 2024-05-14 DIAGNOSIS — Z13.820 SCREENING FOR OSTEOPOROSIS: ICD-10-CM

## 2024-05-14 DIAGNOSIS — Z78.0 POST-MENOPAUSE: ICD-10-CM

## 2024-05-14 PROCEDURE — 77067 SCR MAMMO BI INCL CAD: CPT

## 2024-05-14 PROCEDURE — 77080 DXA BONE DENSITY AXIAL: CPT

## 2024-05-17 ENCOUNTER — TELEPHONE (OUTPATIENT)
Age: 84
End: 2024-05-17

## 2024-05-17 NOTE — TELEPHONE ENCOUNTER
----- Message from Brenda Gaming MD sent at 5/16/2024  9:24 PM EDT -----  Please let patient know that her bone density was normal at this time.   She probably does not need to have it repeated again.   However, we will make decision based on clinical presentation.   Her bones are very strong.   Thanks!

## 2024-05-23 ENCOUNTER — NURSE ONLY (OUTPATIENT)
Age: 84
End: 2024-05-23

## 2024-05-23 DIAGNOSIS — E78.2 MIXED HYPERLIPIDEMIA: ICD-10-CM

## 2024-05-23 DIAGNOSIS — E55.9 VITAMIN D DEFICIENCY, UNSPECIFIED: ICD-10-CM

## 2024-05-23 DIAGNOSIS — R53.83 MALAISE AND FATIGUE: ICD-10-CM

## 2024-05-23 DIAGNOSIS — R53.81 MALAISE AND FATIGUE: ICD-10-CM

## 2024-05-23 LAB
25(OH)D3 SERPL-MCNC: 40.8 NG/ML (ref 30–100)
ALBUMIN SERPL-MCNC: 3.9 G/DL (ref 3.5–5)
ALBUMIN/GLOB SERPL: 1.6 (ref 1.1–2.2)
ALP SERPL-CCNC: 101 U/L (ref 45–117)
ALT SERPL-CCNC: 32 U/L (ref 12–78)
ANION GAP SERPL CALC-SCNC: 3 MMOL/L (ref 5–15)
AST SERPL-CCNC: 24 U/L (ref 15–37)
BASOPHILS # BLD: 0 K/UL (ref 0–0.1)
BASOPHILS NFR BLD: 1 % (ref 0–1)
BILIRUB SERPL-MCNC: 0.3 MG/DL (ref 0.2–1)
BUN SERPL-MCNC: 20 MG/DL (ref 6–20)
BUN/CREAT SERPL: 25 (ref 12–20)
CALCIUM SERPL-MCNC: 9.3 MG/DL (ref 8.5–10.1)
CHLORIDE SERPL-SCNC: 110 MMOL/L (ref 97–108)
CHOLEST SERPL-MCNC: 146 MG/DL
CO2 SERPL-SCNC: 26 MMOL/L (ref 21–32)
CREAT SERPL-MCNC: 0.79 MG/DL (ref 0.55–1.02)
DIFFERENTIAL METHOD BLD: ABNORMAL
EOSINOPHIL # BLD: 0.2 K/UL (ref 0–0.4)
EOSINOPHIL NFR BLD: 5 % (ref 0–7)
ERYTHROCYTE [DISTWIDTH] IN BLOOD BY AUTOMATED COUNT: 12.4 % (ref 11.5–14.5)
GLOBULIN SER CALC-MCNC: 2.5 G/DL (ref 2–4)
GLUCOSE SERPL-MCNC: 106 MG/DL (ref 65–100)
HCT VFR BLD AUTO: 38.6 % (ref 35–47)
HDLC SERPL-MCNC: 47 MG/DL
HDLC SERPL: 3.1 (ref 0–5)
HGB BLD-MCNC: 12.9 G/DL (ref 11.5–16)
IMM GRANULOCYTES # BLD AUTO: 0 K/UL (ref 0–0.04)
IMM GRANULOCYTES NFR BLD AUTO: 1 % (ref 0–0.5)
LDLC SERPL CALC-MCNC: 78.4 MG/DL (ref 0–100)
LYMPHOCYTES # BLD: 1.4 K/UL (ref 0.8–3.5)
LYMPHOCYTES NFR BLD: 35 % (ref 12–49)
MCH RBC QN AUTO: 30.3 PG (ref 26–34)
MCHC RBC AUTO-ENTMCNC: 33.4 G/DL (ref 30–36.5)
MCV RBC AUTO: 90.6 FL (ref 80–99)
MONOCYTES # BLD: 0.5 K/UL (ref 0–1)
MONOCYTES NFR BLD: 13 % (ref 5–13)
NEUTS SEG # BLD: 1.9 K/UL (ref 1.8–8)
NEUTS SEG NFR BLD: 45 % (ref 32–75)
NRBC # BLD: 0 K/UL (ref 0–0.01)
NRBC BLD-RTO: 0 PER 100 WBC
PLATELET # BLD AUTO: 150 K/UL (ref 150–400)
PMV BLD AUTO: 9.9 FL (ref 8.9–12.9)
POTASSIUM SERPL-SCNC: 4.2 MMOL/L (ref 3.5–5.1)
PROT SERPL-MCNC: 6.4 G/DL (ref 6.4–8.2)
RBC # BLD AUTO: 4.26 M/UL (ref 3.8–5.2)
SODIUM SERPL-SCNC: 139 MMOL/L (ref 136–145)
T4 FREE SERPL-MCNC: 0.8 NG/DL (ref 0.8–1.5)
TRIGL SERPL-MCNC: 103 MG/DL
TSH SERPL DL<=0.05 MIU/L-ACNC: 1.58 UIU/ML (ref 0.36–3.74)
VLDLC SERPL CALC-MCNC: 20.6 MG/DL
WBC # BLD AUTO: 4 K/UL (ref 3.6–11)

## 2024-05-24 ENCOUNTER — TELEPHONE (OUTPATIENT)
Age: 84
End: 2024-05-24

## 2024-05-24 NOTE — TELEPHONE ENCOUNTER
----- Message from Brenda Gaming MD sent at 5/24/2024  3:06 AM EDT -----  Please let patient know that her labs are stable/normal. No worrisome findings are noted. Platelets are in the normal range at this time.   Follow up every 6-months.   Thanks!

## 2024-07-09 DIAGNOSIS — G47.09 OTHER INSOMNIA: ICD-10-CM

## 2024-07-09 DIAGNOSIS — F33.1 MODERATE EPISODE OF RECURRENT MAJOR DEPRESSIVE DISORDER (HCC): ICD-10-CM

## 2024-07-09 DIAGNOSIS — I10 ESSENTIAL (PRIMARY) HYPERTENSION: ICD-10-CM

## 2024-07-09 RX ORDER — AMLODIPINE BESYLATE 5 MG/1
TABLET ORAL
Qty: 90 TABLET | Refills: 1 | Status: SHIPPED | OUTPATIENT
Start: 2024-07-09

## 2024-07-09 RX ORDER — MIRTAZAPINE 7.5 MG/1
7.5 TABLET, FILM COATED ORAL NIGHTLY
Qty: 90 TABLET | Refills: 1 | Status: SHIPPED | OUTPATIENT
Start: 2024-07-09

## 2024-07-12 RX ORDER — DESVENLAFAXINE SUCCINATE 50 MG/1
50 TABLET, EXTENDED RELEASE ORAL DAILY
Qty: 90 TABLET | Refills: 0 | Status: SHIPPED | OUTPATIENT
Start: 2024-07-12

## 2024-07-12 NOTE — TELEPHONE ENCOUNTER
desvenlafaxine succinate     Patient is completely out        [id number] - [place] - [address] [facility] -  [phone] - F [phone]

## 2024-07-29 RX ORDER — DESVENLAFAXINE SUCCINATE 50 MG/1
50 TABLET, EXTENDED RELEASE ORAL DAILY
Qty: 90 TABLET | Refills: 0 | Status: SHIPPED | OUTPATIENT
Start: 2024-07-29

## 2024-10-09 RX ORDER — DESVENLAFAXINE 50 MG/1
50 TABLET, FILM COATED, EXTENDED RELEASE ORAL DAILY
Qty: 90 TABLET | Refills: 0 | Status: SHIPPED | OUTPATIENT
Start: 2024-10-09

## 2024-10-09 NOTE — TELEPHONE ENCOUNTER
[facility] #[id number] - [place], [place] - [address] [facility] -  [phone] -  [phone]      desvenlafaxine succinate (PRISTIQ) 50 MG TB24 extended release tablet

## 2024-10-16 ENCOUNTER — TELEPHONE (OUTPATIENT)
Age: 84
End: 2024-10-16

## 2024-10-16 NOTE — TELEPHONE ENCOUNTER
Dr. Gaming will not be in the office on 11/6/2024.   10:30 AM appointment has been cancelled!    Left (2) voice mails for patient to call office to reschedule.

## 2024-10-25 DIAGNOSIS — I10 PRIMARY HYPERTENSION: ICD-10-CM

## 2024-10-25 RX ORDER — LOSARTAN POTASSIUM 100 MG/1
100 TABLET ORAL DAILY
Qty: 90 TABLET | Refills: 1 | Status: SHIPPED | OUTPATIENT
Start: 2024-10-25

## 2024-10-30 ENCOUNTER — OFFICE VISIT (OUTPATIENT)
Age: 84
End: 2024-10-30
Payer: MEDICARE

## 2024-10-30 ENCOUNTER — LAB (OUTPATIENT)
Age: 84
End: 2024-10-30
Payer: MEDICARE

## 2024-10-30 VITALS
HEART RATE: 92 BPM | SYSTOLIC BLOOD PRESSURE: 130 MMHG | OXYGEN SATURATION: 96 % | DIASTOLIC BLOOD PRESSURE: 62 MMHG | TEMPERATURE: 97.9 F | BODY MASS INDEX: 28.17 KG/M2 | WEIGHT: 149.2 LBS | RESPIRATION RATE: 16 BRPM | HEIGHT: 61 IN

## 2024-10-30 DIAGNOSIS — E55.9 VITAMIN D DEFICIENCY, UNSPECIFIED: ICD-10-CM

## 2024-10-30 DIAGNOSIS — I10 PRIMARY HYPERTENSION: Primary | ICD-10-CM

## 2024-10-30 DIAGNOSIS — I10 PRIMARY HYPERTENSION: ICD-10-CM

## 2024-10-30 DIAGNOSIS — E55.9 VITAMIN D DEFICIENCY: ICD-10-CM

## 2024-10-30 DIAGNOSIS — F33.1 MODERATE EPISODE OF RECURRENT MAJOR DEPRESSIVE DISORDER (HCC): ICD-10-CM

## 2024-10-30 PROCEDURE — 99214 OFFICE O/P EST MOD 30 MIN: CPT | Performed by: INTERNAL MEDICINE

## 2024-10-30 ASSESSMENT — PATIENT HEALTH QUESTIONNAIRE - PHQ9
10. IF YOU CHECKED OFF ANY PROBLEMS, HOW DIFFICULT HAVE THESE PROBLEMS MADE IT FOR YOU TO DO YOUR WORK, TAKE CARE OF THINGS AT HOME, OR GET ALONG WITH OTHER PEOPLE: NOT DIFFICULT AT ALL
9. THOUGHTS THAT YOU WOULD BE BETTER OFF DEAD, OR OF HURTING YOURSELF: NOT AT ALL
6. FEELING BAD ABOUT YOURSELF - OR THAT YOU ARE A FAILURE OR HAVE LET YOURSELF OR YOUR FAMILY DOWN: NOT AT ALL
SUM OF ALL RESPONSES TO PHQ QUESTIONS 1-9: 0
8. MOVING OR SPEAKING SO SLOWLY THAT OTHER PEOPLE COULD HAVE NOTICED. OR THE OPPOSITE, BEING SO FIGETY OR RESTLESS THAT YOU HAVE BEEN MOVING AROUND A LOT MORE THAN USUAL: NOT AT ALL
SUM OF ALL RESPONSES TO PHQ QUESTIONS 1-9: 0
1. LITTLE INTEREST OR PLEASURE IN DOING THINGS: NOT AT ALL
2. FEELING DOWN, DEPRESSED OR HOPELESS: NOT AT ALL
5. POOR APPETITE OR OVEREATING: NOT AT ALL
SUM OF ALL RESPONSES TO PHQ QUESTIONS 1-9: 0
4. FEELING TIRED OR HAVING LITTLE ENERGY: NOT AT ALL
SUM OF ALL RESPONSES TO PHQ9 QUESTIONS 1 & 2: 0
SUM OF ALL RESPONSES TO PHQ QUESTIONS 1-9: 0
3. TROUBLE FALLING OR STAYING ASLEEP: NOT AT ALL
7. TROUBLE CONCENTRATING ON THINGS, SUCH AS READING THE NEWSPAPER OR WATCHING TELEVISION: NOT AT ALL

## 2024-10-30 NOTE — PROGRESS NOTES
Beatrice Lucero is a 83 y.o. female    Chief Complaint   Patient presents with    Follow-up     6 month follow up       /62   Pulse 92   Temp 97.9 °F (36.6 °C)   Resp 16   Ht 1.549 m (5' 1\")   Wt 67.7 kg (149 lb 3.2 oz)   SpO2 96%   BMI 28.19 kg/m²         1. Have you been to the ER, urgent care clinic since your last visit?  Hospitalized since your last visit? No    2. Have you seen or consulted any other health care providers outside of the [facility] since your last visit?  Include any pap smears or colon screening. No    Learning Assessment:       No data to display                Fall Risk Assessment:      10/30/2024     2:01 PM 2024    10:15 AM 10/16/2023     9:54 AM 2023     9:00 AM 2022     7:05 PM 2021     9:47 AM 2021    11:56 PM   Amb Fall Risk Assessment and TUG Test   Do you feel unsteady or are you worried about falling?  no no yes       2 or more falls in past year? no no yes       Fall with injury in past year? no no no       Fall in past 12 months?    0      Able to walk?    Yes      Total Score     1 2 2       Abuse Screenin/11/2024     1:00 PM 10/16/2023     9:00 AM   AMB Abuse Screening   Do you ever feel afraid of your partner? N N   Are you in a relationship with someone who physically or mentally threatens you? N N   Is it safe for you to go home? Y Y       ADL Screening:       No data to display                
  Temp 97.9 °F (36.6 °C)   Resp 16   Ht 1.549 m (5' 1\")   Wt 67.7 kg (149 lb 3.2 oz)   SpO2 96%   BMI 28.19 kg/m²       Physical Exam  Vitals and nursing note reviewed.   Constitutional:       Appearance: Normal appearance.   HENT:      Head: Normocephalic and atraumatic.      Nose: Nose normal.      Mouth/Throat:      Mouth: Mucous membranes are moist.      Pharynx: Oropharynx is clear.   Eyes:      Extraocular Movements: Extraocular movements intact.      Pupils: Pupils are equal, round, and reactive to light.   Cardiovascular:      Rate and Rhythm: Normal rate and regular rhythm.      Pulses: Normal pulses.      Heart sounds: Normal heart sounds.   Pulmonary:      Effort: Pulmonary effort is normal.      Breath sounds: Normal breath sounds.   Musculoskeletal:         General: Normal range of motion.   Skin:     General: Skin is warm.      Capillary Refill: Capillary refill takes less than 2 seconds.   Neurological:      General: No focal deficit present.      Mental Status: She is alert.   Psychiatric:         Mood and Affect: Mood normal.         Behavior: Behavior normal.         Thought Content: Thought content normal.         Judgment: Judgment normal.       No results found for any visits on 10/30/24.    Brenda Gaming MD  [facility]   10/30/24

## 2024-10-31 LAB
25(OH)D3 SERPL-MCNC: 36.5 NG/ML (ref 30–100)
ALBUMIN SERPL-MCNC: 3.6 G/DL (ref 3.5–5)
ALBUMIN/GLOB SERPL: 1.2 (ref 1.1–2.2)
ALP SERPL-CCNC: 101 U/L (ref 45–117)
ALT SERPL-CCNC: 33 U/L (ref 12–78)
ANION GAP SERPL CALC-SCNC: 4 MMOL/L (ref 2–12)
AST SERPL-CCNC: 29 U/L (ref 15–37)
BASOPHILS # BLD: 0 K/UL (ref 0–0.1)
BASOPHILS NFR BLD: 1 % (ref 0–1)
BILIRUB SERPL-MCNC: 0.5 MG/DL (ref 0.2–1)
BUN SERPL-MCNC: 17 MG/DL (ref 6–20)
BUN/CREAT SERPL: 16 (ref 12–20)
CALCIUM SERPL-MCNC: 8.9 MG/DL (ref 8.5–10.1)
CHLORIDE SERPL-SCNC: 112 MMOL/L (ref 97–108)
CO2 SERPL-SCNC: 26 MMOL/L (ref 21–32)
CREAT SERPL-MCNC: 1.06 MG/DL (ref 0.55–1.02)
DIFFERENTIAL METHOD BLD: NORMAL
EOSINOPHIL # BLD: 0.2 K/UL (ref 0–0.4)
EOSINOPHIL NFR BLD: 3 % (ref 0–7)
ERYTHROCYTE [DISTWIDTH] IN BLOOD BY AUTOMATED COUNT: 12.4 % (ref 11.5–14.5)
GLOBULIN SER CALC-MCNC: 3 G/DL (ref 2–4)
GLUCOSE SERPL-MCNC: 116 MG/DL (ref 65–100)
HCT VFR BLD AUTO: 40 % (ref 35–47)
HGB BLD-MCNC: 13.1 G/DL (ref 11.5–16)
IMM GRANULOCYTES # BLD AUTO: 0 K/UL (ref 0–0.04)
IMM GRANULOCYTES NFR BLD AUTO: 0 % (ref 0–0.5)
LYMPHOCYTES # BLD: 1.5 K/UL (ref 0.8–3.5)
LYMPHOCYTES NFR BLD: 32 % (ref 12–49)
MCH RBC QN AUTO: 30.9 PG (ref 26–34)
MCHC RBC AUTO-ENTMCNC: 32.8 G/DL (ref 30–36.5)
MCV RBC AUTO: 94.3 FL (ref 80–99)
MONOCYTES # BLD: 0.6 K/UL (ref 0–1)
MONOCYTES NFR BLD: 13 % (ref 5–13)
NEUTS SEG # BLD: 2.4 K/UL (ref 1.8–8)
NEUTS SEG NFR BLD: 51 % (ref 32–75)
NRBC # BLD: 0 K/UL (ref 0–0.01)
NRBC BLD-RTO: 0 PER 100 WBC
PLATELET # BLD AUTO: 168 K/UL (ref 150–400)
PMV BLD AUTO: 10.1 FL (ref 8.9–12.9)
POTASSIUM SERPL-SCNC: 4.3 MMOL/L (ref 3.5–5.1)
PROT SERPL-MCNC: 6.6 G/DL (ref 6.4–8.2)
RBC # BLD AUTO: 4.24 M/UL (ref 3.8–5.2)
SODIUM SERPL-SCNC: 142 MMOL/L (ref 136–145)
WBC # BLD AUTO: 4.8 K/UL (ref 3.6–11)

## 2024-10-31 ASSESSMENT — ENCOUNTER SYMPTOMS
EYES NEGATIVE: 1
RESPIRATORY NEGATIVE: 1
GASTROINTESTINAL NEGATIVE: 1
ALLERGIC/IMMUNOLOGIC NEGATIVE: 1

## 2024-11-01 ENCOUNTER — TELEPHONE (OUTPATIENT)
Age: 84
End: 2024-11-01

## 2024-11-01 DIAGNOSIS — N28.9 MILD RENAL INSUFFICIENCY: Primary | ICD-10-CM

## 2024-11-01 NOTE — TELEPHONE ENCOUNTER
----- Message from Dr. Brenda Gaming MD sent at 11/1/2024 12:23 PM EDT -----  Please let patient know that her labs were stable. However, her renal function was just slightly decreased.   This could have been because she was fasting for labs. I would like to repeat these after she is well hydrated.   I have placed order. She can get done in the next 1-2 weeks.   Thanks!

## 2024-11-07 ENCOUNTER — LAB (OUTPATIENT)
Age: 84
End: 2024-11-07

## 2024-11-07 DIAGNOSIS — N28.9 MILD RENAL INSUFFICIENCY: ICD-10-CM

## 2024-11-07 LAB
ANION GAP SERPL CALC-SCNC: 4 MMOL/L (ref 2–12)
BUN SERPL-MCNC: 14 MG/DL (ref 6–20)
BUN/CREAT SERPL: 17 (ref 12–20)
CALCIUM SERPL-MCNC: 9.5 MG/DL (ref 8.5–10.1)
CHLORIDE SERPL-SCNC: 110 MMOL/L (ref 97–108)
CO2 SERPL-SCNC: 26 MMOL/L (ref 21–32)
CREAT SERPL-MCNC: 0.84 MG/DL (ref 0.55–1.02)
GLUCOSE SERPL-MCNC: 104 MG/DL (ref 65–100)
POTASSIUM SERPL-SCNC: 4.4 MMOL/L (ref 3.5–5.1)
SODIUM SERPL-SCNC: 140 MMOL/L (ref 136–145)

## 2024-11-11 ENCOUNTER — TELEPHONE (OUTPATIENT)
Age: 84
End: 2024-11-11

## 2024-11-11 NOTE — TELEPHONE ENCOUNTER
----- Message from Dr. Alla Ugalde MD sent at 11/10/2024  7:52 PM EST -----  Kidney function is back to normal. Stay well hydrated.

## 2024-12-17 DIAGNOSIS — F33.1 MODERATE EPISODE OF RECURRENT MAJOR DEPRESSIVE DISORDER (HCC): ICD-10-CM

## 2024-12-17 DIAGNOSIS — G47.09 OTHER INSOMNIA: ICD-10-CM

## 2024-12-17 RX ORDER — MIRTAZAPINE 7.5 MG/1
7.5 TABLET, FILM COATED ORAL NIGHTLY
Qty: 90 TABLET | Refills: 1 | Status: SHIPPED | OUTPATIENT
Start: 2024-12-17

## 2025-02-26 DIAGNOSIS — I10 ESSENTIAL (PRIMARY) HYPERTENSION: ICD-10-CM

## 2025-02-26 RX ORDER — AMLODIPINE BESYLATE 5 MG/1
TABLET ORAL
Qty: 90 TABLET | Refills: 1 | Status: SHIPPED | OUTPATIENT
Start: 2025-02-26

## 2025-03-31 ENCOUNTER — OFFICE VISIT (OUTPATIENT)
Age: 85
End: 2025-03-31
Payer: MEDICARE

## 2025-03-31 VITALS
RESPIRATION RATE: 16 BRPM | WEIGHT: 149 LBS | DIASTOLIC BLOOD PRESSURE: 54 MMHG | TEMPERATURE: 97.9 F | HEART RATE: 85 BPM | BODY MASS INDEX: 27.42 KG/M2 | SYSTOLIC BLOOD PRESSURE: 124 MMHG | HEIGHT: 62 IN | OXYGEN SATURATION: 99 %

## 2025-03-31 DIAGNOSIS — C44.602: Primary | ICD-10-CM

## 2025-03-31 DIAGNOSIS — E66.3 OVERWEIGHT (BMI 25.0-29.9): ICD-10-CM

## 2025-03-31 DIAGNOSIS — H91.93 BILATERAL HEARING LOSS, UNSPECIFIED HEARING LOSS TYPE: ICD-10-CM

## 2025-03-31 DIAGNOSIS — F33.1 MODERATE EPISODE OF RECURRENT MAJOR DEPRESSIVE DISORDER (HCC): ICD-10-CM

## 2025-03-31 PROCEDURE — 99214 OFFICE O/P EST MOD 30 MIN: CPT | Performed by: FAMILY MEDICINE

## 2025-03-31 PROCEDURE — 3078F DIAST BP <80 MM HG: CPT | Performed by: FAMILY MEDICINE

## 2025-03-31 PROCEDURE — G2211 COMPLEX E/M VISIT ADD ON: HCPCS | Performed by: FAMILY MEDICINE

## 2025-03-31 PROCEDURE — 1123F ACP DISCUSS/DSCN MKR DOCD: CPT | Performed by: FAMILY MEDICINE

## 2025-03-31 PROCEDURE — 3074F SYST BP LT 130 MM HG: CPT | Performed by: FAMILY MEDICINE

## 2025-03-31 SDOH — ECONOMIC STABILITY: FOOD INSECURITY: WITHIN THE PAST 12 MONTHS, THE FOOD YOU BOUGHT JUST DIDN'T LAST AND YOU DIDN'T HAVE MONEY TO GET MORE.: NEVER TRUE

## 2025-03-31 SDOH — ECONOMIC STABILITY: FOOD INSECURITY: WITHIN THE PAST 12 MONTHS, YOU WORRIED THAT YOUR FOOD WOULD RUN OUT BEFORE YOU GOT MONEY TO BUY MORE.: NEVER TRUE

## 2025-03-31 ASSESSMENT — PATIENT HEALTH QUESTIONNAIRE - PHQ9
5. POOR APPETITE OR OVEREATING: NOT AT ALL
10. IF YOU CHECKED OFF ANY PROBLEMS, HOW DIFFICULT HAVE THESE PROBLEMS MADE IT FOR YOU TO DO YOUR WORK, TAKE CARE OF THINGS AT HOME, OR GET ALONG WITH OTHER PEOPLE: NOT DIFFICULT AT ALL
2. FEELING DOWN, DEPRESSED OR HOPELESS: NOT AT ALL
SUM OF ALL RESPONSES TO PHQ QUESTIONS 1-9: 0
SUM OF ALL RESPONSES TO PHQ QUESTIONS 1-9: 0
3. TROUBLE FALLING OR STAYING ASLEEP: NOT AT ALL
9. THOUGHTS THAT YOU WOULD BE BETTER OFF DEAD, OR OF HURTING YOURSELF: NOT AT ALL
6. FEELING BAD ABOUT YOURSELF - OR THAT YOU ARE A FAILURE OR HAVE LET YOURSELF OR YOUR FAMILY DOWN: NOT AT ALL
4. FEELING TIRED OR HAVING LITTLE ENERGY: NOT AT ALL
1. LITTLE INTEREST OR PLEASURE IN DOING THINGS: NOT AT ALL
7. TROUBLE CONCENTRATING ON THINGS, SUCH AS READING THE NEWSPAPER OR WATCHING TELEVISION: NOT AT ALL
SUM OF ALL RESPONSES TO PHQ QUESTIONS 1-9: 0
SUM OF ALL RESPONSES TO PHQ QUESTIONS 1-9: 0
8. MOVING OR SPEAKING SO SLOWLY THAT OTHER PEOPLE COULD HAVE NOTICED. OR THE OPPOSITE, BEING SO FIGETY OR RESTLESS THAT YOU HAVE BEEN MOVING AROUND A LOT MORE THAN USUAL: NOT AT ALL

## 2025-03-31 NOTE — PROGRESS NOTES
[place] [id number] [place] [address]  Phone: [phone]  Fax: [phone]        Chief Complaint   Patient presents with    Other     Needs referral for surgeon to removed a cancer spot on her right hand.     Hearing Problem     She would also like referral for Dr Lancaster for her hearing.      She is a 84 y.o. female who presents for acute visit. Usual patient of Dr. Gaming. New to me.  Has several issues that she would like to discuss today.    Follows with [facility].  She was recently diagnosed with skin cancer of her right hand at a routine visit.  Had bx.  She was recommended to go to Plastics for removal. Not sure why she was referred. Concerned as she is not sure if they take her insurance at the Plastics group. Asking for a referral from me to this group to follow through.  She has not discussed this with the Plastics group or [facility].    Has a history of hearing loss of both ears.  Uses hearing aides. Knows that she needs to get new ones. Asking for a referral back to her ENT for insurance purposes. Usually follows with Dr. Lancaster.    Prior to Visit Medications    Medication Sig Taking? Authorizing Provider   amLODIPine (NORVASC) 5 MG tablet TAKE 1 TABLET BY MOUTH EVERY DAY Yes Brenda Gaming MD   mirtazapine (REMERON) 7.5 MG tablet TAKE 1 TABLET BY MOUTH NIGHTLY FOR INSOMNIA Yes Brenda Gaming MD   losartan (COZAAR) 100 MG tablet TAKE 1 TABLET BY MOUTH EVERY DAY Yes Brenda Gaming MD   desvenlafaxine succinate (PRISTIQ) 50 MG TB24 extended release tablet Take 1 tablet by mouth daily Yes Brenda Gaming MD   fluticasone (FLONASE) 50 MCG/ACT nasal spray SPRAY SPRAY 1 SPRAY INTO EACH NOSTRIL EVERY DAY FOR 30 DAYS Yes Automatic Reconciliation, Ar   naproxen (NAPROSYN) 500 MG tablet Take 1 tablet by mouth 2 times daily (with meals) Yes Automatic Reconciliation, Ar   rosuvastatin (CRESTOR) 10 MG tablet Take 1 tablet by mouth nightly Yes

## 2025-03-31 NOTE — PROGRESS NOTES
Identified pt with two pt identifiers(name and )    Chief Complaint   Patient presents with    Other     Needs referral for surgeon to removed a cancer spot on her right hand.     Hearing Problem     She would also like referral for Dr Lancaster for her hearing.         Health Maintenance Due   Topic    DTaP/Tdap/Td vaccine (1 - Tdap)    Pneumococcal 50+ years Vaccine (1 of 1 - PCV)    Respiratory Syncytial Virus (RSV) Pregnant or age 60 yrs+ (1 - 1-dose 75+ series)    Annual Wellness Visit (Medicare Advantage)        Wt Readings from Last 3 Encounters:   25 67.6 kg (149 lb)   10/30/24 67.7 kg (149 lb 3.2 oz)   24 66.2 kg (146 lb)     Temp Readings from Last 3 Encounters:   25 97.9 °F (36.6 °C) (Temporal)   10/30/24 97.9 °F (36.6 °C)   24 97.1 °F (36.2 °C) (Temporal)     BP Readings from Last 3 Encounters:   25 (!) 124/54   10/30/24 130/62   24 120/72     Pulse Readings from Last 3 Encounters:   25 85   10/30/24 92   24 78           Depression Screening:  :         3/31/2025     9:06 AM 10/30/2024     2:00 PM 2024    10:15 AM 2024     1:09 PM 10/16/2023     9:52 AM 2023    10:00 AM 3/15/2023     3:00 PM   PHQ-9 Questionaire   Little interest or pleasure in doing things 0 0 0 0 1 0 0   Feeling down, depressed, or hopeless 0 0 0 0 1 0 0   Trouble falling or staying asleep, or sleeping too much 0 0 0       Feeling tired or having little energy 0 0 0       Poor appetite or overeating 0 0 0       Feeling bad about yourself - or that you are a failure or have let yourself or your family down 0 0 0       Trouble concentrating on things, such as reading the newspaper or watching television 0 0 0       Moving or speaking so slowly that other people could have noticed. Or the opposite - being so fidgety or restless that you have been moving around a lot more than usual 0 0 0       Thoughts that you would be better off dead, or of hurting yourself in some way 0 0 0

## 2025-04-20 DIAGNOSIS — I10 PRIMARY HYPERTENSION: ICD-10-CM

## 2025-04-22 RX ORDER — LOSARTAN POTASSIUM 100 MG/1
100 TABLET ORAL DAILY
Qty: 90 TABLET | Refills: 1 | Status: SHIPPED | OUTPATIENT
Start: 2025-04-22

## 2025-04-23 RX ORDER — DESVENLAFAXINE 50 MG/1
50 TABLET, FILM COATED, EXTENDED RELEASE ORAL DAILY
Qty: 90 TABLET | Refills: 0 | Status: SHIPPED | OUTPATIENT
Start: 2025-04-23

## 2025-05-05 ENCOUNTER — OFFICE VISIT (OUTPATIENT)
Age: 85
End: 2025-05-05
Payer: MEDICARE

## 2025-05-05 VITALS
DIASTOLIC BLOOD PRESSURE: 64 MMHG | HEART RATE: 84 BPM | RESPIRATION RATE: 16 BRPM | HEIGHT: 62 IN | OXYGEN SATURATION: 97 % | TEMPERATURE: 97.8 F | WEIGHT: 147.4 LBS | SYSTOLIC BLOOD PRESSURE: 122 MMHG | BODY MASS INDEX: 27.12 KG/M2

## 2025-05-05 DIAGNOSIS — Z00.00 MEDICARE ANNUAL WELLNESS VISIT, SUBSEQUENT: Primary | ICD-10-CM

## 2025-05-05 DIAGNOSIS — R73.09 ELEVATED GLUCOSE: ICD-10-CM

## 2025-05-05 DIAGNOSIS — R53.83 MALAISE AND FATIGUE: ICD-10-CM

## 2025-05-05 DIAGNOSIS — E78.2 MIXED HYPERLIPIDEMIA: ICD-10-CM

## 2025-05-05 DIAGNOSIS — R53.81 MALAISE AND FATIGUE: ICD-10-CM

## 2025-05-05 DIAGNOSIS — E55.9 VITAMIN D DEFICIENCY: ICD-10-CM

## 2025-05-05 PROCEDURE — 3078F DIAST BP <80 MM HG: CPT | Performed by: INTERNAL MEDICINE

## 2025-05-05 PROCEDURE — 99213 OFFICE O/P EST LOW 20 MIN: CPT | Performed by: INTERNAL MEDICINE

## 2025-05-05 PROCEDURE — 1123F ACP DISCUSS/DSCN MKR DOCD: CPT | Performed by: INTERNAL MEDICINE

## 2025-05-05 PROCEDURE — G0439 PPPS, SUBSEQ VISIT: HCPCS | Performed by: INTERNAL MEDICINE

## 2025-05-05 PROCEDURE — 1159F MED LIST DOCD IN RCRD: CPT | Performed by: INTERNAL MEDICINE

## 2025-05-05 PROCEDURE — 3074F SYST BP LT 130 MM HG: CPT | Performed by: INTERNAL MEDICINE

## 2025-05-05 ASSESSMENT — LIFESTYLE VARIABLES
HOW MANY STANDARD DRINKS CONTAINING ALCOHOL DO YOU HAVE ON A TYPICAL DAY: PATIENT DOES NOT DRINK
HOW OFTEN DO YOU HAVE A DRINK CONTAINING ALCOHOL: NEVER

## 2025-05-05 ASSESSMENT — PATIENT HEALTH QUESTIONNAIRE - PHQ9
9. THOUGHTS THAT YOU WOULD BE BETTER OFF DEAD, OR OF HURTING YOURSELF: NOT AT ALL
SUM OF ALL RESPONSES TO PHQ QUESTIONS 1-9: 0
3. TROUBLE FALLING OR STAYING ASLEEP: NOT AT ALL
10. IF YOU CHECKED OFF ANY PROBLEMS, HOW DIFFICULT HAVE THESE PROBLEMS MADE IT FOR YOU TO DO YOUR WORK, TAKE CARE OF THINGS AT HOME, OR GET ALONG WITH OTHER PEOPLE: NOT DIFFICULT AT ALL
SUM OF ALL RESPONSES TO PHQ QUESTIONS 1-9: 0
8. MOVING OR SPEAKING SO SLOWLY THAT OTHER PEOPLE COULD HAVE NOTICED. OR THE OPPOSITE, BEING SO FIGETY OR RESTLESS THAT YOU HAVE BEEN MOVING AROUND A LOT MORE THAN USUAL: NOT AT ALL
1. LITTLE INTEREST OR PLEASURE IN DOING THINGS: NOT AT ALL
SUM OF ALL RESPONSES TO PHQ QUESTIONS 1-9: 0
7. TROUBLE CONCENTRATING ON THINGS, SUCH AS READING THE NEWSPAPER OR WATCHING TELEVISION: NOT AT ALL
SUM OF ALL RESPONSES TO PHQ QUESTIONS 1-9: 0
6. FEELING BAD ABOUT YOURSELF - OR THAT YOU ARE A FAILURE OR HAVE LET YOURSELF OR YOUR FAMILY DOWN: NOT AT ALL
2. FEELING DOWN, DEPRESSED OR HOPELESS: NOT AT ALL
5. POOR APPETITE OR OVEREATING: NOT AT ALL
4. FEELING TIRED OR HAVING LITTLE ENERGY: NOT AT ALL

## 2025-05-05 NOTE — PROGRESS NOTES
Identified pt with two pt identifiers(name and )    Chief Complaint   Patient presents with    Medicare AWV    Knee Pain     Pt has been having left knee pain.        Health Maintenance Due   Topic    DTaP/Tdap/Td vaccine (1 - Tdap)    Pneumococcal 50+ years Vaccine (1 of 1 - PCV)    Respiratory Syncytial Virus (RSV) Pregnant or age 60 yrs+ (1 - 1-dose 75+ series)    Annual Wellness Visit (Medicare Advantage)     Lipids        Wt Readings from Last 3 Encounters:   25 66.9 kg (147 lb 6.4 oz)   25 67.6 kg (149 lb)   10/30/24 67.7 kg (149 lb 3.2 oz)     Temp Readings from Last 3 Encounters:   25 97.8 °F (36.6 °C) (Temporal)   25 97.9 °F (36.6 °C) (Temporal)   10/30/24 97.9 °F (36.6 °C)     BP Readings from Last 3 Encounters:   25 122/64   25 (!) 124/54   10/30/24 130/62     Pulse Readings from Last 3 Encounters:   25 84   25 85   10/30/24 92           Depression Screening:  :         2025     1:40 PM 3/31/2025     9:06 AM 10/30/2024     2:00 PM 2024    10:15 AM 2024     1:09 PM 10/16/2023     9:52 AM 2023    10:00 AM   PHQ-9 Questionaire   Little interest or pleasure in doing things 0 0 0 0 0 1 0   Feeling down, depressed, or hopeless 0 0 0 0 0 1 0   Trouble falling or staying asleep, or sleeping too much 0 0 0 0      Feeling tired or having little energy 0 0 0 0      Poor appetite or overeating 0 0 0 0      Feeling bad about yourself - or that you are a failure or have let yourself or your family down 0 0 0 0      Trouble concentrating on things, such as reading the newspaper or watching television 0 0 0 0      Moving or speaking so slowly that other people could have noticed. Or the opposite - being so fidgety or restless that you have been moving around a lot more than usual 0 0 0 0      Thoughts that you would be better off dead, or of hurting yourself in some way 0 0 0 0      PHQ-9 Total Score 0 0 0 0 0 2 0   If you checked off any problems, how

## 2025-05-05 NOTE — PROGRESS NOTES
Medicare Annual Wellness Visit    Beatrice Lucero is here for Medicare AWV and Knee Pain (Pt has been having left knee pain.)    Assessment & Plan   Medicare annual wellness visit, subsequent  Anticipatory guidance discussed.   Immunizations reviewed  HM updated.   Vitamin D deficiency  -     Vitamin D 25 Hydroxy; Future  Mixed hyperlipidemia  -     Comprehensive Metabolic Panel; Future  -     CBC with Auto Differential; Future  -     Lipid Panel; Future  Malaise and fatigue  -     TSH; Future  -     T4, Free; Future  Elevated glucose  -     Hemoglobin A1C; Future    I have discussed the diagnosis with the patient and the intended treatment plan as seen in the above orders. The patient has received an after-visit summary and questions were answered concerning future plans. Asked to return should symptoms worsen or not improve with treatment. Any pending labs and studies will be relayed to patient when they become available.     Pt verbalizes understanding of plan of care and denies further questions or concerns at this time.     Return in 1 year (on 5/5/2026), or if symptoms worsen or fail to improve, for Medicare AWV, 6-month follow up chronic medical problems.       Subjective   84F who presents for her AWV. Doing generally well. She  has a past medical history of Colon cancer (HCC), Essential hypertension, Family history of skin cancer, Ill-defined condition, and Neurological disorder.The above acute and/or chronic problems were also addressed today:    Patient's complete Health Risk Assessment and screening values have been reviewed and are found in Flowsheets. The following problems were reviewed today and where indicated follow up appointments were made and/or referrals ordered.    Positive Risk Factor Screenings with Interventions:                      Advanced Directives:  Do you have a Living Will?: (!) No    Intervention:  has NO advanced directive - information provided        Objective   Vitals:    05/05/25

## 2025-05-08 ENCOUNTER — LAB (OUTPATIENT)
Age: 85
End: 2025-05-08

## 2025-05-08 DIAGNOSIS — E78.2 MIXED HYPERLIPIDEMIA: ICD-10-CM

## 2025-05-08 DIAGNOSIS — R53.81 MALAISE AND FATIGUE: ICD-10-CM

## 2025-05-08 DIAGNOSIS — E55.9 VITAMIN D DEFICIENCY: ICD-10-CM

## 2025-05-08 DIAGNOSIS — R73.09 ELEVATED GLUCOSE: ICD-10-CM

## 2025-05-08 DIAGNOSIS — R53.83 MALAISE AND FATIGUE: ICD-10-CM

## 2025-05-09 ENCOUNTER — RESULTS FOLLOW-UP (OUTPATIENT)
Age: 85
End: 2025-05-09

## 2025-05-09 LAB
25(OH)D3+25(OH)D2 SERPL-MCNC: 38.5 NG/ML (ref 30–100)
ALBUMIN SERPL-MCNC: 4.5 G/DL (ref 3.7–4.7)
ALP SERPL-CCNC: 110 IU/L (ref 44–121)
ALT SERPL-CCNC: 28 IU/L (ref 0–32)
AST SERPL-CCNC: 28 IU/L (ref 0–40)
BASOPHILS # BLD AUTO: 0 X10E3/UL (ref 0–0.2)
BASOPHILS NFR BLD AUTO: 1 %
BILIRUB SERPL-MCNC: 0.4 MG/DL (ref 0–1.2)
BUN SERPL-MCNC: 16 MG/DL (ref 8–27)
BUN/CREAT SERPL: 21 (ref 12–28)
CALCIUM SERPL-MCNC: 9.3 MG/DL (ref 8.7–10.3)
CHLORIDE SERPL-SCNC: 106 MMOL/L (ref 96–106)
CHOLEST SERPL-MCNC: 166 MG/DL (ref 100–199)
CO2 SERPL-SCNC: 18 MMOL/L (ref 20–29)
CREAT SERPL-MCNC: 0.77 MG/DL (ref 0.57–1)
EGFRCR SERPLBLD CKD-EPI 2021: 76 ML/MIN/1.73
EOSINOPHIL # BLD AUTO: 0.2 X10E3/UL (ref 0–0.4)
EOSINOPHIL NFR BLD AUTO: 4 %
ERYTHROCYTE [DISTWIDTH] IN BLOOD BY AUTOMATED COUNT: 12.7 % (ref 11.7–15.4)
GLOBULIN SER CALC-MCNC: 2.3 G/DL (ref 1.5–4.5)
GLUCOSE SERPL-MCNC: 94 MG/DL (ref 70–99)
HBA1C MFR BLD: 6.1 % (ref 4.8–5.6)
HCT VFR BLD AUTO: 42.9 % (ref 34–46.6)
HDLC SERPL-MCNC: 43 MG/DL
HGB BLD-MCNC: 13.9 G/DL (ref 11.1–15.9)
IMM GRANULOCYTES # BLD AUTO: 0 X10E3/UL (ref 0–0.1)
IMM GRANULOCYTES NFR BLD AUTO: 0 %
IMP & REVIEW OF LAB RESULTS: NORMAL
LDLC SERPL CALC-MCNC: 99 MG/DL (ref 0–99)
LYMPHOCYTES # BLD AUTO: 1.3 X10E3/UL (ref 0.7–3.1)
LYMPHOCYTES NFR BLD AUTO: 30 %
MCH RBC QN AUTO: 30.3 PG (ref 26.6–33)
MCHC RBC AUTO-ENTMCNC: 32.4 G/DL (ref 31.5–35.7)
MCV RBC AUTO: 94 FL (ref 79–97)
MONOCYTES # BLD AUTO: 0.5 X10E3/UL (ref 0.1–0.9)
MONOCYTES NFR BLD AUTO: 11 %
NEUTROPHILS # BLD AUTO: 2.4 X10E3/UL (ref 1.4–7)
NEUTROPHILS NFR BLD AUTO: 54 %
PLATELET # BLD AUTO: 175 X10E3/UL (ref 150–450)
POTASSIUM SERPL-SCNC: 4.4 MMOL/L (ref 3.5–5.2)
PROT SERPL-MCNC: 6.8 G/DL (ref 6–8.5)
RBC # BLD AUTO: 4.58 X10E6/UL (ref 3.77–5.28)
SODIUM SERPL-SCNC: 142 MMOL/L (ref 134–144)
T4 FREE SERPL-MCNC: 0.89 NG/DL (ref 0.82–1.77)
TRIGL SERPL-MCNC: 132 MG/DL (ref 0–149)
TSH SERPL DL<=0.005 MIU/L-ACNC: 1.71 UIU/ML (ref 0.45–4.5)
VLDLC SERPL CALC-MCNC: 24 MG/DL (ref 5–40)
WBC # BLD AUTO: 4.3 X10E3/UL (ref 3.4–10.8)

## 2025-05-09 NOTE — TELEPHONE ENCOUNTER
----- Message from Dr. Brenda Gaming MD sent at 5/9/2025 12:16 PM EDT -----  Please let patient know that her labs are stable.   However, she does have mildly elevated glucose levels (chronic) and I checked a HBA1C with these labs and she is in the prediabetic range. No new medications are required, but we should continue to follow this closely.   She needs to decrease any simple carbs (cookies, cakes, sugary snacks).   Eat more complex carbs (brown rice, Quinoa, grains, vegetables).   Follow up in 6-months.   Otherwise, everything looks good.   Thanks!

## 2025-07-22 RX ORDER — DESVENLAFAXINE 50 MG/1
50 TABLET, FILM COATED, EXTENDED RELEASE ORAL DAILY
Qty: 90 TABLET | Refills: 0 | Status: SHIPPED | OUTPATIENT
Start: 2025-07-22

## 2025-08-16 ENCOUNTER — APPOINTMENT (OUTPATIENT)
Dept: VASCULAR SURGERY | Facility: HOSPITAL | Age: 85
DRG: 074 | End: 2025-08-16
Payer: MEDICARE

## 2025-08-16 ENCOUNTER — APPOINTMENT (OUTPATIENT)
Facility: HOSPITAL | Age: 85
DRG: 074 | End: 2025-08-16
Payer: MEDICARE

## 2025-08-16 ENCOUNTER — HOSPITAL ENCOUNTER (INPATIENT)
Facility: HOSPITAL | Age: 85
LOS: 1 days | Discharge: HOME | DRG: 074 | End: 2025-08-17
Attending: EMERGENCY MEDICINE | Admitting: FAMILY MEDICINE
Payer: MEDICARE

## 2025-08-16 DIAGNOSIS — R53.1 RIGHT SIDED WEAKNESS: ICD-10-CM

## 2025-08-16 DIAGNOSIS — I63.9 CEREBROVASCULAR ACCIDENT (CVA), UNSPECIFIED MECHANISM (HCC): Primary | ICD-10-CM

## 2025-08-16 DIAGNOSIS — R20.0 RIGHT SIDED NUMBNESS: ICD-10-CM

## 2025-08-16 LAB
ALBUMIN SERPL-MCNC: 3.8 G/DL (ref 3.5–5)
ALBUMIN/GLOB SERPL: 1.2 (ref 1.1–2.2)
ALP SERPL-CCNC: 103 U/L (ref 45–117)
ALT SERPL-CCNC: 39 U/L (ref 12–78)
ANION GAP SERPL CALC-SCNC: 9 MMOL/L (ref 2–12)
APPEARANCE UR: CLEAR
AST SERPL-CCNC: 39 U/L (ref 15–37)
BACTERIA URNS QL MICRO: NEGATIVE /HPF
BASOPHILS # BLD: 0.03 K/UL (ref 0–0.1)
BASOPHILS NFR BLD: 0.7 % (ref 0–1)
BILIRUB SERPL-MCNC: 0.5 MG/DL (ref 0.2–1)
BILIRUB UR QL: NEGATIVE
BUN SERPL-MCNC: 18 MG/DL (ref 6–20)
BUN/CREAT SERPL: 24 (ref 12–20)
CALCIUM SERPL-MCNC: 9.1 MG/DL (ref 8.5–10.1)
CHLORIDE SERPL-SCNC: 105 MMOL/L (ref 97–108)
CO2 SERPL-SCNC: 27 MMOL/L (ref 21–32)
COLOR UR: NORMAL
CREAT SERPL-MCNC: 0.75 MG/DL (ref 0.55–1.02)
DIFFERENTIAL METHOD BLD: NORMAL
EOSINOPHIL # BLD: 0.14 K/UL (ref 0–0.4)
EOSINOPHIL NFR BLD: 3.1 % (ref 0–7)
EPITH CASTS URNS QL MICRO: NORMAL /LPF
ERYTHROCYTE [DISTWIDTH] IN BLOOD BY AUTOMATED COUNT: 12.5 % (ref 11.5–14.5)
GLOBULIN SER CALC-MCNC: 3.1 G/DL (ref 2–4)
GLUCOSE SERPL-MCNC: 104 MG/DL (ref 65–100)
GLUCOSE UR STRIP.AUTO-MCNC: NEGATIVE MG/DL
HCT VFR BLD AUTO: 38.2 % (ref 35–47)
HGB BLD-MCNC: 12.8 G/DL (ref 11.5–16)
HGB UR QL STRIP: NEGATIVE
IMM GRANULOCYTES # BLD AUTO: 0.01 K/UL (ref 0–0.04)
IMM GRANULOCYTES NFR BLD AUTO: 0.2 % (ref 0–0.5)
INR PPP: 1 (ref 0.9–1.1)
KETONES UR QL STRIP.AUTO: NEGATIVE MG/DL
LEUKOCYTE ESTERASE UR QL STRIP.AUTO: NEGATIVE
LYMPHOCYTES # BLD: 1.51 K/UL (ref 0.8–3.5)
LYMPHOCYTES NFR BLD: 33.4 % (ref 12–49)
MCH RBC QN AUTO: 30.2 PG (ref 26–34)
MCHC RBC AUTO-ENTMCNC: 33.5 G/DL (ref 30–36.5)
MCV RBC AUTO: 90.1 FL (ref 80–99)
MONOCYTES # BLD: 0.45 K/UL (ref 0–1)
MONOCYTES NFR BLD: 10 % (ref 5–13)
NEUTS SEG # BLD: 2.38 K/UL (ref 1.8–8)
NEUTS SEG NFR BLD: 52.6 % (ref 32–75)
NITRITE UR QL STRIP.AUTO: NEGATIVE
NRBC # BLD: 0 K/UL (ref 0–0.01)
NRBC BLD-RTO: 0 PER 100 WBC
PH UR STRIP: 6 (ref 5–8)
PLATELET # BLD AUTO: 164 K/UL (ref 150–400)
PMV BLD AUTO: 9.5 FL (ref 8.9–12.9)
POTASSIUM SERPL-SCNC: 4.6 MMOL/L (ref 3.5–5.1)
PROT SERPL-MCNC: 6.9 G/DL (ref 6.4–8.2)
PROT UR STRIP-MCNC: NEGATIVE MG/DL
PROTHROMBIN TIME: 10.2 SEC (ref 9.2–11.2)
RBC # BLD AUTO: 4.24 M/UL (ref 3.8–5.2)
RBC #/AREA URNS HPF: NORMAL /HPF (ref 0–5)
SODIUM SERPL-SCNC: 141 MMOL/L (ref 136–145)
SP GR UR REFRACTOMETRY: 1.01 (ref 1–1.03)
TROPONIN I SERPL HS-MCNC: 5 NG/L (ref 0–51)
TSH, 3RD GENERATION: 1.48 UIU/ML (ref 0.27–4.2)
UROBILINOGEN UR QL STRIP.AUTO: 0.2 EU/DL (ref 0.2–1)
WBC # BLD AUTO: 4.5 K/UL (ref 3.6–11)
WBC URNS QL MICRO: NORMAL /HPF (ref 0–4)

## 2025-08-16 PROCEDURE — 99285 EMERGENCY DEPT VISIT HI MDM: CPT

## 2025-08-16 PROCEDURE — 99222 1ST HOSP IP/OBS MODERATE 55: CPT | Performed by: FAMILY MEDICINE

## 2025-08-16 PROCEDURE — 36415 COLL VENOUS BLD VENIPUNCTURE: CPT

## 2025-08-16 PROCEDURE — 6370000000 HC RX 637 (ALT 250 FOR IP)

## 2025-08-16 PROCEDURE — 85025 COMPLETE CBC W/AUTO DIFF WBC: CPT

## 2025-08-16 PROCEDURE — 84443 ASSAY THYROID STIM HORMONE: CPT

## 2025-08-16 PROCEDURE — 6370000000 HC RX 637 (ALT 250 FOR IP): Performed by: EMERGENCY MEDICINE

## 2025-08-16 PROCEDURE — 81001 URINALYSIS AUTO W/SCOPE: CPT

## 2025-08-16 PROCEDURE — 71045 X-RAY EXAM CHEST 1 VIEW: CPT

## 2025-08-16 PROCEDURE — 80053 COMPREHEN METABOLIC PANEL: CPT

## 2025-08-16 PROCEDURE — 85610 PROTHROMBIN TIME: CPT

## 2025-08-16 PROCEDURE — 82607 VITAMIN B-12: CPT

## 2025-08-16 PROCEDURE — 84484 ASSAY OF TROPONIN QUANT: CPT

## 2025-08-16 PROCEDURE — 93005 ELECTROCARDIOGRAM TRACING: CPT | Performed by: EMERGENCY MEDICINE

## 2025-08-16 PROCEDURE — 6360000002 HC RX W HCPCS

## 2025-08-16 PROCEDURE — 2060000000 HC ICU INTERMEDIATE R&B

## 2025-08-16 PROCEDURE — 2500000003 HC RX 250 WO HCPCS

## 2025-08-16 PROCEDURE — 70450 CT HEAD/BRAIN W/O DYE: CPT

## 2025-08-16 PROCEDURE — 93880 EXTRACRANIAL BILAT STUDY: CPT

## 2025-08-16 PROCEDURE — 82746 ASSAY OF FOLIC ACID SERUM: CPT

## 2025-08-16 RX ORDER — ASPIRIN 81 MG/1
81 TABLET, CHEWABLE ORAL DAILY
Status: DISCONTINUED | OUTPATIENT
Start: 2025-08-17 | End: 2025-08-17 | Stop reason: HOSPADM

## 2025-08-16 RX ORDER — FLUTICASONE PROPIONATE 50 MCG
1 SPRAY, SUSPENSION (ML) NASAL DAILY PRN
Status: DISCONTINUED | OUTPATIENT
Start: 2025-08-16 | End: 2025-08-17 | Stop reason: HOSPADM

## 2025-08-16 RX ORDER — ENOXAPARIN SODIUM 100 MG/ML
40 INJECTION SUBCUTANEOUS DAILY
Status: DISCONTINUED | OUTPATIENT
Start: 2025-08-16 | End: 2025-08-17 | Stop reason: HOSPADM

## 2025-08-16 RX ORDER — SODIUM CHLORIDE 9 MG/ML
INJECTION, SOLUTION INTRAVENOUS PRN
Status: DISCONTINUED | OUTPATIENT
Start: 2025-08-16 | End: 2025-08-17 | Stop reason: HOSPADM

## 2025-08-16 RX ORDER — LOSARTAN POTASSIUM 50 MG/1
100 TABLET ORAL DAILY
Status: DISCONTINUED | OUTPATIENT
Start: 2025-08-16 | End: 2025-08-17 | Stop reason: HOSPADM

## 2025-08-16 RX ORDER — MIRTAZAPINE 15 MG/1
7.5 TABLET, FILM COATED ORAL NIGHTLY
Status: DISCONTINUED | OUTPATIENT
Start: 2025-08-16 | End: 2025-08-17 | Stop reason: HOSPADM

## 2025-08-16 RX ORDER — AMLODIPINE BESYLATE 5 MG/1
5 TABLET ORAL DAILY
Status: DISCONTINUED | OUTPATIENT
Start: 2025-08-16 | End: 2025-08-17 | Stop reason: HOSPADM

## 2025-08-16 RX ORDER — POLYETHYLENE GLYCOL 3350 17 G/17G
17 POWDER, FOR SOLUTION ORAL DAILY PRN
Status: DISCONTINUED | OUTPATIENT
Start: 2025-08-16 | End: 2025-08-17 | Stop reason: HOSPADM

## 2025-08-16 RX ORDER — MINERAL OIL AND WHITE PETROLATUM 150; 830 MG/G; MG/G
OINTMENT OPHTHALMIC PRN
Status: DISCONTINUED | OUTPATIENT
Start: 2025-08-16 | End: 2025-08-17 | Stop reason: HOSPADM

## 2025-08-16 RX ORDER — LORAZEPAM 1 MG/1
1 TABLET ORAL
Status: COMPLETED | OUTPATIENT
Start: 2025-08-16 | End: 2025-08-17

## 2025-08-16 RX ORDER — ASPIRIN 81 MG/1
81 TABLET, CHEWABLE ORAL ONCE
Status: COMPLETED | OUTPATIENT
Start: 2025-08-16 | End: 2025-08-16

## 2025-08-16 RX ORDER — ONDANSETRON 4 MG/1
4 TABLET, ORALLY DISINTEGRATING ORAL EVERY 8 HOURS PRN
Status: DISCONTINUED | OUTPATIENT
Start: 2025-08-16 | End: 2025-08-17 | Stop reason: HOSPADM

## 2025-08-16 RX ORDER — ASPIRIN 81 MG/1
162 TABLET, CHEWABLE ORAL ONCE
Status: COMPLETED | OUTPATIENT
Start: 2025-08-16 | End: 2025-08-16

## 2025-08-16 RX ORDER — SODIUM CHLORIDE 0.9 % (FLUSH) 0.9 %
5-40 SYRINGE (ML) INJECTION EVERY 12 HOURS SCHEDULED
Status: DISCONTINUED | OUTPATIENT
Start: 2025-08-16 | End: 2025-08-17 | Stop reason: HOSPADM

## 2025-08-16 RX ORDER — ROSUVASTATIN CALCIUM 10 MG/1
40 TABLET, COATED ORAL NIGHTLY
Status: DISCONTINUED | OUTPATIENT
Start: 2025-08-16 | End: 2025-08-17 | Stop reason: HOSPADM

## 2025-08-16 RX ORDER — SODIUM CHLORIDE 0.9 % (FLUSH) 0.9 %
5-40 SYRINGE (ML) INJECTION PRN
Status: DISCONTINUED | OUTPATIENT
Start: 2025-08-16 | End: 2025-08-17 | Stop reason: HOSPADM

## 2025-08-16 RX ORDER — ONDANSETRON 2 MG/ML
4 INJECTION INTRAMUSCULAR; INTRAVENOUS EVERY 6 HOURS PRN
Status: DISCONTINUED | OUTPATIENT
Start: 2025-08-16 | End: 2025-08-17 | Stop reason: HOSPADM

## 2025-08-16 RX ADMIN — ENOXAPARIN SODIUM 40 MG: 100 INJECTION SUBCUTANEOUS at 21:18

## 2025-08-16 RX ADMIN — ROSUVASTATIN CALCIUM 40 MG: 10 TABLET, FILM COATED ORAL at 21:18

## 2025-08-16 RX ADMIN — ASPIRIN 81 MG: 81 TABLET, CHEWABLE ORAL at 13:25

## 2025-08-16 RX ADMIN — ASPIRIN 162 MG: 81 TABLET, CHEWABLE ORAL at 17:36

## 2025-08-16 RX ADMIN — MIRTAZAPINE 7.5 MG: 15 TABLET, FILM COATED ORAL at 21:18

## 2025-08-16 RX ADMIN — SODIUM CHLORIDE, PRESERVATIVE FREE 10 ML: 5 INJECTION INTRAVENOUS at 21:18

## 2025-08-16 ASSESSMENT — PAIN SCALES - GENERAL
PAINLEVEL_OUTOF10: 0
PAINLEVEL_OUTOF10: 3
PAINLEVEL_OUTOF10: 0
PAINLEVEL_OUTOF10: 0

## 2025-08-16 ASSESSMENT — PAIN - FUNCTIONAL ASSESSMENT: PAIN_FUNCTIONAL_ASSESSMENT: 0-10

## 2025-08-16 ASSESSMENT — PAIN DESCRIPTION - LOCATION: LOCATION: HEAD

## 2025-08-16 ASSESSMENT — PAIN DESCRIPTION - DESCRIPTORS: DESCRIPTORS: ACHING

## 2025-08-17 ENCOUNTER — APPOINTMENT (OUTPATIENT)
Facility: HOSPITAL | Age: 85
DRG: 074 | End: 2025-08-17
Payer: MEDICARE

## 2025-08-17 VITALS
BODY MASS INDEX: 26.96 KG/M2 | OXYGEN SATURATION: 96 % | SYSTOLIC BLOOD PRESSURE: 130 MMHG | TEMPERATURE: 97.7 F | HEART RATE: 75 BPM | DIASTOLIC BLOOD PRESSURE: 59 MMHG | RESPIRATION RATE: 18 BRPM | HEIGHT: 62 IN

## 2025-08-17 PROBLEM — R20.2 FACIAL PARESTHESIA: Status: ACTIVE | Noted: 2025-08-17

## 2025-08-17 LAB
AMPHET UR QL SCN: NEGATIVE
ANION GAP SERPL CALC-SCNC: 9 MMOL/L (ref 2–14)
BARBITURATES UR QL SCN: NEGATIVE
BENZODIAZ UR QL: NEGATIVE
BUN SERPL-MCNC: 22 MG/DL (ref 8–23)
BUN/CREAT SERPL: 31 (ref 12–20)
CALCIUM SERPL-MCNC: 8.9 MG/DL (ref 8.8–10.2)
CANNABINOIDS UR QL SCN: NEGATIVE
CHLORIDE SERPL-SCNC: 110 MMOL/L (ref 98–107)
CHOLEST SERPL-MCNC: 156 MG/DL (ref 0–200)
CO2 SERPL-SCNC: 24 MMOL/L (ref 20–29)
COCAINE UR QL SCN: NEGATIVE
CREAT SERPL-MCNC: 0.7 MG/DL (ref 0.6–1)
EKG ATRIAL RATE: 67 BPM
EKG DIAGNOSIS: NORMAL
EKG P AXIS: 31 DEGREES
EKG P-R INTERVAL: 164 MS
EKG Q-T INTERVAL: 434 MS
EKG QRS DURATION: 90 MS
EKG QTC CALCULATION (BAZETT): 458 MS
EKG R AXIS: 1 DEGREES
EKG T AXIS: 23 DEGREES
EKG VENTRICULAR RATE: 67 BPM
ERYTHROCYTE [DISTWIDTH] IN BLOOD BY AUTOMATED COUNT: 12.4 % (ref 11.5–14.5)
EST. AVERAGE GLUCOSE BLD GHB EST-MCNC: 125 MG/DL
FENTANYL: NEGATIVE
FOLATE SERPL-MCNC: 17 NG/ML (ref 4.8–24.2)
GLUCOSE SERPL-MCNC: 101 MG/DL (ref 65–100)
HBA1C MFR BLD: 6 % (ref 4–5.6)
HCT VFR BLD AUTO: 36.2 % (ref 35–47)
HDLC SERPL-MCNC: 38 MG/DL (ref 40–60)
HDLC SERPL: 4.1 (ref 0–5)
HGB BLD-MCNC: 12 G/DL (ref 11.5–16)
LDLC SERPL CALC-MCNC: 80 MG/DL (ref 0–100)
Lab: NORMAL
MCH RBC QN AUTO: 30.2 PG (ref 26–34)
MCHC RBC AUTO-ENTMCNC: 33.1 G/DL (ref 30–36.5)
MCV RBC AUTO: 91 FL (ref 80–99)
METHADONE UR QL: NEGATIVE
NRBC # BLD: 0 K/UL (ref 0–0.01)
NRBC BLD-RTO: 0 PER 100 WBC
OPIATES UR QL: NEGATIVE
PCP UR QL: NEGATIVE
PLATELET # BLD AUTO: 158 K/UL (ref 150–400)
PMV BLD AUTO: 9.3 FL (ref 8.9–12.9)
POTASSIUM SERPL-SCNC: 4.3 MMOL/L (ref 3.5–5.1)
RBC # BLD AUTO: 3.98 M/UL (ref 3.8–5.2)
SODIUM SERPL-SCNC: 142 MMOL/L (ref 136–145)
TRIGL SERPL-MCNC: 193 MG/DL (ref 0–150)
VAS LEFT CCA DIST EDV: 15.5 CM/S
VAS LEFT CCA DIST PSV: 58.4 CM/S
VAS LEFT CCA PROX EDV: 15.5 CM/S
VAS LEFT CCA PROX PSV: 80.6 CM/S
VAS LEFT ECA EDV: 8.1 CM/S
VAS LEFT ECA PSV: 96.5 CM/S
VAS LEFT ICA DIST EDV: 26.5 CM/S
VAS LEFT ICA DIST PSV: 78 CM/S
VAS LEFT ICA MID EDV: 23 CM/S
VAS LEFT ICA MID PSV: 102.5 CM/S
VAS LEFT ICA PROX EDV: 19.1 CM/S
VAS LEFT ICA PROX PSV: 63.3 CM/S
VAS LEFT ICA/CCA PSV: 1.8 NO UNITS
VAS LEFT SUBCLAVIAN PROX EDV: 3.5 CM/S
VAS LEFT SUBCLAVIAN PROX PSV: 196.6 CM/S
VAS LEFT VERTEBRAL EDV: 13.8 CM/S
VAS LEFT VERTEBRAL PSV: 36.5 CM/S
VAS RIGHT CCA DIST EDV: 14.7 CM/S
VAS RIGHT CCA DIST PSV: 49.7 CM/S
VAS RIGHT CCA PROX EDV: 16.4 CM/S
VAS RIGHT CCA PROX PSV: 79 CM/S
VAS RIGHT ECA EDV: 9.1 CM/S
VAS RIGHT ECA PSV: 76.1 CM/S
VAS RIGHT ICA DIST EDV: 23.7 CM/S
VAS RIGHT ICA DIST PSV: 70.9 CM/S
VAS RIGHT ICA MID EDV: 19.3 CM/S
VAS RIGHT ICA MID PSV: 54.5 CM/S
VAS RIGHT ICA PROX EDV: 17.1 CM/S
VAS RIGHT ICA PROX PSV: 80.9 CM/S
VAS RIGHT ICA/CCA PSV: 1.6 NO UNITS
VAS RIGHT SUBCLAVIAN PROX EDV: 6 CM/S
VAS RIGHT SUBCLAVIAN PROX PSV: 115.5 CM/S
VAS RIGHT VERTEBRAL EDV: 8.9 CM/S
VAS RIGHT VERTEBRAL PSV: 30 CM/S
VIT B12 SERPL-MCNC: 441 PG/ML (ref 232–1245)
VLDLC SERPL CALC-MCNC: 39 MG/DL
WBC # BLD AUTO: 5.1 K/UL (ref 3.6–11)

## 2025-08-17 PROCEDURE — 99238 HOSP IP/OBS DSCHRG MGMT 30/<: CPT | Performed by: FAMILY MEDICINE

## 2025-08-17 PROCEDURE — 80061 LIPID PANEL: CPT

## 2025-08-17 PROCEDURE — 93010 ELECTROCARDIOGRAM REPORT: CPT | Performed by: SPECIALIST

## 2025-08-17 PROCEDURE — 83036 HEMOGLOBIN GLYCOSYLATED A1C: CPT

## 2025-08-17 PROCEDURE — 70551 MRI BRAIN STEM W/O DYE: CPT

## 2025-08-17 PROCEDURE — 97116 GAIT TRAINING THERAPY: CPT

## 2025-08-17 PROCEDURE — 85027 COMPLETE CBC AUTOMATED: CPT

## 2025-08-17 PROCEDURE — 97112 NEUROMUSCULAR REEDUCATION: CPT | Performed by: OCCUPATIONAL THERAPIST

## 2025-08-17 PROCEDURE — 97162 PT EVAL MOD COMPLEX 30 MIN: CPT

## 2025-08-17 PROCEDURE — 97165 OT EVAL LOW COMPLEX 30 MIN: CPT | Performed by: OCCUPATIONAL THERAPIST

## 2025-08-17 PROCEDURE — 6370000000 HC RX 637 (ALT 250 FOR IP)

## 2025-08-17 PROCEDURE — 94761 N-INVAS EAR/PLS OXIMETRY MLT: CPT

## 2025-08-17 PROCEDURE — 2500000003 HC RX 250 WO HCPCS

## 2025-08-17 PROCEDURE — 97530 THERAPEUTIC ACTIVITIES: CPT

## 2025-08-17 PROCEDURE — 80048 BASIC METABOLIC PNL TOTAL CA: CPT

## 2025-08-17 PROCEDURE — 99223 1ST HOSP IP/OBS HIGH 75: CPT | Performed by: PSYCHIATRY & NEUROLOGY

## 2025-08-17 RX ADMIN — LORAZEPAM 1 MG: 1 TABLET ORAL at 11:35

## 2025-08-17 RX ADMIN — ASPIRIN 81 MG: 81 TABLET, CHEWABLE ORAL at 11:35

## 2025-08-17 RX ADMIN — SODIUM CHLORIDE, PRESERVATIVE FREE 10 ML: 5 INJECTION INTRAVENOUS at 11:35

## 2025-08-17 ASSESSMENT — PAIN SCALES - GENERAL
PAINLEVEL_OUTOF10: 0
PAINLEVEL_OUTOF10: 0

## 2025-08-27 ENCOUNTER — OFFICE VISIT (OUTPATIENT)
Age: 85
End: 2025-08-27

## 2025-08-27 VITALS
DIASTOLIC BLOOD PRESSURE: 68 MMHG | RESPIRATION RATE: 18 BRPM | HEIGHT: 62 IN | WEIGHT: 148.8 LBS | OXYGEN SATURATION: 97 % | TEMPERATURE: 98.1 F | SYSTOLIC BLOOD PRESSURE: 128 MMHG | HEART RATE: 78 BPM | BODY MASS INDEX: 27.38 KG/M2

## 2025-08-27 DIAGNOSIS — Z09 HOSPITAL DISCHARGE FOLLOW-UP: ICD-10-CM

## 2025-08-27 DIAGNOSIS — Z86.69 H/O BELL'S PALSY: ICD-10-CM

## 2025-08-27 DIAGNOSIS — H91.93 BILATERAL HEARING LOSS, UNSPECIFIED HEARING LOSS TYPE: ICD-10-CM

## 2025-08-27 DIAGNOSIS — H93.13 TINNITUS OF BOTH EARS: ICD-10-CM

## 2025-08-27 DIAGNOSIS — R20.0 RIGHT FACIAL NUMBNESS: Primary | ICD-10-CM

## 2025-08-27 DIAGNOSIS — E78.1 HIGH TRIGLYCERIDES: ICD-10-CM

## 2025-08-29 DIAGNOSIS — I10 ESSENTIAL (PRIMARY) HYPERTENSION: ICD-10-CM

## 2025-08-29 RX ORDER — AMLODIPINE BESYLATE 5 MG/1
5 TABLET ORAL DAILY
Qty: 90 TABLET | Refills: 1 | Status: SHIPPED | OUTPATIENT
Start: 2025-08-29

## (undated) DEVICE — BAG BELONG PT PERS CLEAR HANDL

## (undated) DEVICE — ADULT SPO2 SENSOR: Brand: NELLCOR

## (undated) DEVICE — TUBING ADMIN SET INTRAV ARTERI -- CONVERT TO ITEM 340436

## (undated) DEVICE — SOLIDIFIER MEDC 1200ML -- CONVERT TO 356117

## (undated) DEVICE — Device

## (undated) DEVICE — KENDALL RADIOLUCENT FOAM MONITORING ELECTRODE -RECTANGULAR SHAPE: Brand: KENDALL

## (undated) DEVICE — SET ADMIN 16ML TBNG L100IN 2 Y INJ SITE IV PIGGY BK DISP

## (undated) DEVICE — 1200 GUARD II KIT W/5MM TUBE W/O VAC TUBE: Brand: GUARDIAN

## (undated) DEVICE — CANN NASAL O2 CAPNOGRAPHY AD -- FILTERLINE

## (undated) DEVICE — CATH IV AUTOGRD BC PNK 20GA 25 -- INSYTE

## (undated) DEVICE — KIT COLON W/ 1.1OZ LUB AND 2 END

## (undated) DEVICE — BASIN EMESIS 500CC ROSE 250/CS 60/PLT: Brand: MEDEGEN MEDICAL PRODUCTS, LLC